# Patient Record
Sex: FEMALE | Race: WHITE | NOT HISPANIC OR LATINO | Employment: OTHER | URBAN - METROPOLITAN AREA
[De-identification: names, ages, dates, MRNs, and addresses within clinical notes are randomized per-mention and may not be internally consistent; named-entity substitution may affect disease eponyms.]

---

## 2017-01-29 DIAGNOSIS — E11.8 TYPE 2 DIABETES MELLITUS WITH COMPLICATION, WITHOUT LONG-TERM CURRENT USE OF INSULIN (H): Primary | ICD-10-CM

## 2017-01-30 RX ORDER — LINAGLIPTIN AND METFORMIN HYDROCHLORIDE 2.5; 1 MG/1; MG/1
TABLET, FILM COATED ORAL
Qty: 180 TABLET | OUTPATIENT
Start: 2017-01-30

## 2017-03-17 ENCOUNTER — OFFICE VISIT (OUTPATIENT)
Dept: FAMILY MEDICINE | Facility: CLINIC | Age: 62
End: 2017-03-17

## 2017-03-17 VITALS
BODY MASS INDEX: 28.53 KG/M2 | OXYGEN SATURATION: 97 % | TEMPERATURE: 98.1 F | RESPIRATION RATE: 16 BRPM | HEART RATE: 76 BPM | WEIGHT: 161 LBS | DIASTOLIC BLOOD PRESSURE: 80 MMHG | HEIGHT: 63 IN | SYSTOLIC BLOOD PRESSURE: 138 MMHG

## 2017-03-17 DIAGNOSIS — E11.8 TYPE 2 DIABETES MELLITUS WITH COMPLICATION, WITHOUT LONG-TERM CURRENT USE OF INSULIN (H): Primary | ICD-10-CM

## 2017-03-17 DIAGNOSIS — E66.3 OVERWEIGHT (BMI 25.0-29.9): ICD-10-CM

## 2017-03-17 DIAGNOSIS — Z11.59 NEED FOR HEPATITIS C SCREENING TEST: ICD-10-CM

## 2017-03-17 LAB — HBA1C MFR BLD: 6.9 % (ref 4–7)

## 2017-03-17 PROCEDURE — 83036 HEMOGLOBIN GLYCOSYLATED A1C: CPT | Performed by: FAMILY MEDICINE

## 2017-03-17 PROCEDURE — 99213 OFFICE O/P EST LOW 20 MIN: CPT | Performed by: FAMILY MEDICINE

## 2017-03-17 PROCEDURE — 36415 COLL VENOUS BLD VENIPUNCTURE: CPT | Performed by: FAMILY MEDICINE

## 2017-03-17 PROCEDURE — 86803 HEPATITIS C AB TEST: CPT | Mod: 90 | Performed by: FAMILY MEDICINE

## 2017-03-17 NOTE — LETTER
[facility]  [facility] [address]  [place]  [address]    March 27, 2017        Shayla Winter  [address]  [place] [address]              Dear Shayla Winter      LAB RESULTS:     The results of your recent hepatitis C screen were NORMAL.  If you have any further questions or problems, please contact our office at [phone].          Latisha Flanagan MD

## 2017-03-17 NOTE — MR AVS SNAPSHOT
After Visit Summary   3/17/2017    Shayla Winter    MRN: [id number]           Patient Information     Date Of Birth          1955        Visit Information        Provider Department      3/17/2017 11:45 AM Latisha Flanagan MD [facility]        Today's Diagnoses     Type 2 diabetes mellitus with complication, without long-term current use of insulin (H)    -  1    Need for hepatitis C screening test        Overweight (BMI 25.0-29.9)          Care Instructions    10 pound weight loss    Recheck 3 months        Follow-ups after your visit        Follow-up notes from your care team     Return in about 3 months (around 6/17/2017).      Your next 10 appointments already scheduled     May 31, 2017  8:00 AM CDT   Physical-Complete with Latisha Flanagan MD   [facility] ([facility])    [address]  [address]  [facility] [address] [phone]           Instruct patient that they should have nothing(except water) after midnight the evening prior to the appointment.              Who to contact     If you have questions or need follow up information about today's clinic visit or your schedule please contact [facility] directly at [phone].  Normal or non-critical lab and imaging results will be communicated to you by OpenSesamehart, letter or phone within 4 business days after the clinic has received the results. If you do not hear from us within 7 days, please contact the clinic through OpenSesamehart or phone. If you have a critical or abnormal lab result, we will notify you by phone as soon as possible.  Submit refill requests through [employer] or call your pharmacy and they will forward the refill request to us. Please allow 3 business days for your refill to be completed.          Additional Information About Your Visit        OpenSesameharSpotOnWay Information     [employer] lets you send messages to your doctor, view your test  "results, renew your prescriptions, schedule appointments and more. To sign up, go to [email or web address] . Click on \"Log in\" on the left side of the screen, which will take you to the Welcome page. Then click on \"Sign up Now\" on the right side of the page.     You will be asked to enter the access code listed below, as well as some personal information. Please follow the directions to create your username and password.     Your access code is: [email or web address]  Expires: 6/15/2017 12:39 PM     Your access code will  in 90 days. If you need help or a new code, please call your [place] clinic or [phone].        Care EveryWhere ID     This is your Care EveryWhere ID. This could be used by other organizations to access your [place] medical records  [id number]        Your Vitals Were     Pulse Temperature Respirations Height Last Period Pulse Oximetry    76 98.1  F (36.7  C) (Oral) 16 1.588 m (5' 2.5\") 2005 97%    BMI (Body Mass Index)                   28.98 kg/m2            Blood Pressure from Last 3 Encounters:   17 138/80   16 136/84   16 128/88    Weight from Last 3 Encounters:   17 73 kg (161 lb)   16 73.9 kg (163 lb)   16 74.4 kg (164 lb)              We Performed the Following     C FOOT EXAM  NO CHARGE     Hemoglobin A1c (BFP)     Hepatits C antibody (QUEST)     VENOUS COLLECTION          Where to get your medicines      These medications were sent to [facility] - [address], [facility] [address]     Phone:  [phone]     linagliptin-metFORMIN 2.5-1000 MG per tablet          Primary Care Provider Office Phone # Fax #    Latisha Flanagan [phone]       [facility] [address] [address]        Thank you!     Thank you for choosing [facility]  for your care. Our goal is always to provide you with excellent " care. Hearing back from our patients is one way we can continue to improve our services. Please take a few minutes to complete the written survey that you may receive in the mail after your visit with us. Thank you!             Your Updated Medication List - Protect others around you: Learn how to safely use, store and throw away your medicines at www.disposemymeds.org.          This list is accurate as of: 3/17/17 12:39 PM.  Always use your most recent med list.                   Brand Name Dispense Instructions for use    aspirin 81 MG tablet      Take 1 tablet (81 mg) by mouth daily       atorvastatin 20 MG tablet    LIPITOR    90 tablet    Take 1 tablet (20 mg) by mouth daily       ibuprofen 600 MG tablet    ADVIL/MOTRIN     Take 400 mg by mouth 3 times daily as needed       linagliptin-metFORMIN 2.5-1000 MG per tablet    JENTADUETO    180 tablet    Take 1 tablet by mouth two  times daily with meals       valsartan-hydrochlorothiazide 160-12.5 MG per tablet    DIOVAN HCT    90 tablet    Take 1 tablet by mouth daily

## 2017-03-17 NOTE — PROGRESS NOTES
"SUBJECTIVE:  Shayla Winter is an 61 year old female who presents for evaluation and treatment   of Type 2 diabetes mellitus. Age at diagnosis 52. Family history   positive for diabetes in the patient s mother.   Previous treatment modalities employed include diet, oral agents, exercise and ASA.   Current treatment includes diet, oral agents, exercise and ASA.     Current monitoring regimen: refuses  Home blood sugar records: NA  Last HgbA1c: 6.9  Diabetic complications: peripheral neuropathy  Cardiovascular risk factors: family history, lipids, diabetes mellitus, hypertension, obesity and sedentary life style    Current Outpatient Prescriptions   Medication     linagliptin-metFORMIN (JENTADUETO) 2.5-1000 MG per tablet     valsartan-hydrochlorothiazide (DIOVAN HCT) 160-12.5 MG per tablet     atorvastatin (LIPITOR) 20 MG tablet     aspirin 81 MG tablet     ibuprofen (ADVIL,MOTRIN) 600 MG tablet     [DISCONTINUED] linagliptin-metFORMIN (JENTADUETO) 2.5-1000 MG per tablet     No current facility-administered medications for this visit.      Allergies   Allergen Reactions     Niacin Hives     hives,swollen     Adhesive Tape Rash       Social History   Substance Use Topics     Smoking status: Never Smoker     Smokeless tobacco: Never Used     Alcohol use 0.5 oz/week     1 drink(s) per week      Comment: once a week       Review Of Systems  Skin: negative  Eyes: negative  Ears/Nose/Throat: negative  Respiratory: No shortness of breath, dyspnea on exertion, cough, or hemoptysis  Cardiovascular: negative  Gastrointestinal: negative  Genitourinary: negative  Musculoskeletal: negative  Neurologic: negative  Psychiatric: negative  Hematologic/Lymphatic/Immunologic: negative  Endocrine: diabetes    OBJECTIVE:  /80 (BP Location: Right arm, Patient Position: Chair, Cuff Size: Adult Large)  Pulse 76  Temp 98.1  F (36.7  C) (Oral)  Resp 16  Ht 1.588 m (5' 2.5\")  Wt 73 kg (161 lb)  LMP 09/14/2005  SpO2 97%  BMI 28.98 " kg/m2  General appearance: healthy, alert, no distress, cooperative, smiling and over weight  Skin: Skin color, texture, turgor normal. No rashes or lesions.  Eyes: conjunctivae/corneas clear. PERRL, EOM's intact. Fundi benign  Ears: negative  Oropharynx: Lips, mucosa, and tongue normal. Teeth and gums normal.  Neck: Neck supple. No adenopathy. Thyroid symmetric, normal size,, Carotids without bruits.  Lungs: negative, Percussion normal. Good diaphragmatic excursion. Lungs clear  Heart: negative, PMI normal. No lifts, heaves, or thrills. RRR. No murmurs, clicks gallops or rub  Abdomen: Abdomen soft, non-tender. BS normal. No masses, organomegaly  Extremities: Extremities normal. No deformities, edema, or skin discoloration.  Peripheral pulses: radial=4/4, femoral=4/4, popliteal=4/4, dorsalis pedis=4/4,  Neuro: Gait normal. Reflexes normal and symmetric. Sensation grossly WNL. Monofilament WNL  BMI : Body mass index is 28.98 kg/(m^2).    ASSESSMENT:  (E11.8) Type 2 diabetes mellitus with complication, without long-term current use of insulin (H)  (primary encounter diagnosis)  Plan: Hemoglobin A1c (BFP), VENOUS COLLECTION,         linagliptin-metFORMIN (JENTADUETO) 2.5-1000 MG         per tablet, C FOOT EXAM  NO CHARGE        A low fat diet, regular aerobic exercise like walking 30 minutes daily and weight control is the treatment recommendations at this time  Recheck 3 months fasting. Reviewed concepts of diabetes self-management stressing the primary   role of the patient in monitoring and maintaining control of   diabetes.    (Z11.59) Need for hepatitis C screening test  Plan: VENOUS COLLECTION, Hepatits C antibody (QUEST)            (E66.3) Overweight (BMI 25.0-29.9)  Plan: as above.

## 2017-03-17 NOTE — NURSING NOTE
Patient is here for a recheck of their medication.  Pre-Visit Screening :  Immunizations : up to date    Colonoscopy : is up to date  Mammogram : is up to date  Asthma Action Test/Plan : CARMEN  PHQ9/GAD7 :  NA  Pulse - regular    Medication Reconciliation: complete      CLASSIFICATION OF OVERWEIGHT AND OBESITY BY BMI                         Obesity Class           BMI(kg/m2)  Underweight                                    < 18.5  Normal                                         18.5-24.9  Overweight                                     25.0-29.9  OBESITY                     I                  30.0-34.9                              II                 35.0-39.9  EXTREME OBESITY             III                >40                             Patient's  BMI Body mass index is 28.98 kg/(m^2).  http://hin.nhlbi.nih.gov/menuplanner/menu.cgi  Questioned patient about current smoking habits.  Pt. has never smoked.

## 2017-03-18 LAB
HCV AB - QUEST: NORMAL
SIGNAL TO CUT OFF - QUEST: 0.02

## 2017-03-30 DIAGNOSIS — I10 ESSENTIAL HYPERTENSION, BENIGN: ICD-10-CM

## 2017-03-30 DIAGNOSIS — E11.8 TYPE 2 DIABETES MELLITUS WITH COMPLICATION, WITHOUT LONG-TERM CURRENT USE OF INSULIN (H): ICD-10-CM

## 2017-03-30 DIAGNOSIS — E78.2 MIXED HYPERLIPIDEMIA: ICD-10-CM

## 2017-03-30 RX ORDER — ATORVASTATIN CALCIUM 20 MG/1
TABLET, FILM COATED ORAL
Qty: 90 TABLET | OUTPATIENT
Start: 2017-03-30

## 2017-03-30 RX ORDER — VALSARTAN AND HYDROCHLOROTHIAZIDE 160; 12.5 MG/1; MG/1
TABLET, FILM COATED ORAL
Qty: 90 TABLET | OUTPATIENT
Start: 2017-03-30

## 2017-03-30 NOTE — TELEPHONE ENCOUNTER
Refused Prescriptions:                       Disp   Refills    valsartan-hydrochlorothiazide (DIOVAN-HCT)*90 tab*         Sig: Take 1 tablet by mouth  daily  Refused By: MIRIAM MANUEL  Reason for Refusal: Appt required, please call patient    atorvastatin (LIPITOR) 20 MG tablet [Pharm*90 tab*         Sig: Take 1 tablet by mouth  daily  Refused By: MIRIAM MANUEL  Reason for Refusal: Appt required, please call patient    Pt should have refills up to May, due for fasting ov then.  [place]  [phone] (home)

## 2017-05-10 DIAGNOSIS — E11.8 TYPE 2 DIABETES MELLITUS WITH COMPLICATION, WITHOUT LONG-TERM CURRENT USE OF INSULIN (H): ICD-10-CM

## 2017-05-10 RX ORDER — LINAGLIPTIN AND METFORMIN HYDROCHLORIDE 2.5; 1 MG/1; MG/1
TABLET, FILM COATED ORAL
Qty: 180 TABLET | OUTPATIENT
Start: 2017-05-10

## 2017-05-31 ENCOUNTER — OFFICE VISIT (OUTPATIENT)
Dept: FAMILY MEDICINE | Facility: CLINIC | Age: 62
End: 2017-05-31

## 2017-05-31 VITALS
HEART RATE: 76 BPM | DIASTOLIC BLOOD PRESSURE: 82 MMHG | TEMPERATURE: 98.2 F | BODY MASS INDEX: 28.88 KG/M2 | OXYGEN SATURATION: 98 % | WEIGHT: 163 LBS | SYSTOLIC BLOOD PRESSURE: 132 MMHG | RESPIRATION RATE: 16 BRPM | HEIGHT: 63 IN

## 2017-05-31 DIAGNOSIS — Z12.11 SPECIAL SCREENING FOR MALIGNANT NEOPLASMS, COLON: ICD-10-CM

## 2017-05-31 DIAGNOSIS — Z01.411 ENCOUNTER FOR GYNECOLOGICAL EXAMINATION WITH ABNORMAL FINDING: Primary | ICD-10-CM

## 2017-05-31 DIAGNOSIS — I10 ESSENTIAL HYPERTENSION, BENIGN: ICD-10-CM

## 2017-05-31 DIAGNOSIS — Z76.0 ENCOUNTER FOR MEDICATION REFILL: ICD-10-CM

## 2017-05-31 DIAGNOSIS — E78.2 MIXED HYPERLIPIDEMIA: ICD-10-CM

## 2017-05-31 DIAGNOSIS — Z12.39 BREAST SCREENING: ICD-10-CM

## 2017-05-31 DIAGNOSIS — E11.8 TYPE 2 DIABETES MELLITUS WITH COMPLICATION, WITHOUT LONG-TERM CURRENT USE OF INSULIN (H): ICD-10-CM

## 2017-05-31 LAB
% GRANULOCYTES: 56 %
ALBUMIN URINE MG/G CR: <30 MG/G CREATININE
ALBUMIN URINE MG/SPEC: 10
CREATININE URINE: 10
HBA1C MFR BLD: 7.2 % (ref 4–7)
HCT VFR BLD AUTO: 43.6 % (ref 35–47)
HEMOGLOBIN: 14.1 G/DL (ref 11.7–15.7)
LYMPHOCYTES NFR BLD AUTO: 35.1 %
MCH RBC QN AUTO: 30.7 PG (ref 26–33)
MCHC RBC AUTO-ENTMCNC: 32.3 G/DL (ref 31–36)
MCV RBC AUTO: 94.7 FL (ref 78–100)
MONOCYTES NFR BLD AUTO: 8.9 %
PLATELET COUNT - QUEST: 303 10^9/L (ref 150–375)
RBC # BLD AUTO: 4.6 10*12/L (ref 3.8–5.2)
WBC # BLD AUTO: 6.1 10*9/L (ref 4–11)

## 2017-05-31 PROCEDURE — 80061 LIPID PANEL: CPT | Mod: 90 | Performed by: FAMILY MEDICINE

## 2017-05-31 PROCEDURE — 36415 COLL VENOUS BLD VENIPUNCTURE: CPT | Performed by: FAMILY MEDICINE

## 2017-05-31 PROCEDURE — 80050 GENERAL HEALTH PANEL: CPT | Mod: 90 | Performed by: FAMILY MEDICINE

## 2017-05-31 PROCEDURE — 83036 HEMOGLOBIN GLYCOSYLATED A1C: CPT | Performed by: FAMILY MEDICINE

## 2017-05-31 PROCEDURE — 82043 UR ALBUMIN QUANTITATIVE: CPT | Performed by: FAMILY MEDICINE

## 2017-05-31 PROCEDURE — 99396 PREV VISIT EST AGE 40-64: CPT | Performed by: FAMILY MEDICINE

## 2017-05-31 PROCEDURE — 99213 OFFICE O/P EST LOW 20 MIN: CPT | Mod: 25 | Performed by: FAMILY MEDICINE

## 2017-05-31 RX ORDER — VALSARTAN AND HYDROCHLOROTHIAZIDE 160; 12.5 MG/1; MG/1
1 TABLET, FILM COATED ORAL DAILY
Qty: 90 TABLET | Refills: 1 | Status: SHIPPED | OUTPATIENT
Start: 2017-05-31 | End: 2017-11-22

## 2017-05-31 RX ORDER — ATORVASTATIN CALCIUM 20 MG/1
20 TABLET, FILM COATED ORAL DAILY
Qty: 90 TABLET | Refills: 1 | Status: SHIPPED | OUTPATIENT
Start: 2017-05-31 | End: 2017-11-22

## 2017-05-31 NOTE — NURSING NOTE
Patient is here for a full physical exam and pap.  Pre-Visit Screening :  Immunizations : up to date    Colonoscopy : is due and will be setting up at [facility]  Mammogram : is due and to be scheduled by patient for later completion  Asthma Action Plan/Test : na  PHQ9/GAD7 : na  Pulse - regular  Medication Reconciliation: complete    BP done on the left arm, with a lg sized cuff.  Pulse - regular  My Chart - declines    CLASSIFICATION OF OVERWEIGHT AND OBESITY BY BMI                         Obesity Class           BMI(kg/m2)  Underweight                                    < 18.5  Normal                                         18.5-24.9  Overweight                                     25.0-29.9  OBESITY                     I                  30.0-34.9                              II                 35.0-39.9  EXTREME OBESITY             III                >40                             Patient's  BMI Body mass index is 29.1 kg/(m^2).  http://hin.nhlbi.nih.gov/menuplanner/menu.cgi  Questioned patient about current smoking habits.  Pt. has never smoked.  ETOH screening:  Questions:  1-How often do you have a drink containing alcohol?                             1 times per month(s)  2-How many drinks containing alcohol do you have on a typical day when you are         Drinking?                              1   3- How often do you have 5 or more drinks on one occasion?                              never per never    Have you ever:  None of the patient's responses to the CAGE screening were positive / Negative CAGE score

## 2017-05-31 NOTE — MR AVS SNAPSHOT
After Visit Summary   5/31/2017    Shayla Winter    MRN: [id number]           Patient Information     Date Of Birth          1955        Visit Information        Provider Department      5/31/2017 8:00 AM Latisha Flanagan MD [facility]        Today's Diagnoses     Encounter for gynecological examination with abnormal finding    -  1    Type 2 diabetes mellitus with complication, without long-term current use of insulin (H)        Essential hypertension, benign        Mixed hyperlipidemia        Special screening for malignant neoplasms, colon        Breast screening        Encounter for medication refill          Care Instructions    Reviewed concepts of diabetes self-management stressing the primary   role of the patient in monitoring and maintaining control of   Diabetes.     Consult with specialists/ need help with her engagement of her diabetes. Increased does of linagliptin. Potential medication side effects were discussed with the patient; let me know if any occur.  Recheck 3 months. A low fat diet, regular aerobic exercise like walking 30 minutes daily and weight control is the treatment recommendations at this time.    1)  Medication: continue current medication regimen unchanged for BP and cholesterol  2)  Low fat, low cholesterol diet  3)  Regular aerobic exercise  4)  Recheck in 3 months, sooner should new symptoms or   problems arise.    Patient Education: Reviewed risks of elevated lipids and principles   of treatment.              Follow-ups after your visit        Additional Services     ENDOCRINOLOGY ADULT REFERRAL       Your provider has referred you to: [facility]: [facility] [phone]   [email or web address]      Please be aware that coverage of these services is subject to the terms and limitations of your health insurance plan.  Call member services at your health plan with any benefit or coverage questions.       Please bring the following to your appointment:    >>   Any x-rays, CTs or MRIs which have been performed.  Contact the facility where they were done to arrange for  prior to your scheduled appointment.    >>   List of current medications   >>   This referral request   >>   Any documents/labs given to you for this referral            GASTROENTEROLOGY ADULT REF PROCEDURE ONLY           RADIOLOGY REFERRAL       Your provider has referred you to: [facility]: [facility] [place] [phone]   [email or web address]    Please be aware that coverage of these services is subject to the terms and limitations of your health insurance plan.  Call member services at your health plan with any benefit or coverage questions.      Please bring the following to your appointment:    >>   Any x-rays, CTs or MRIs which have been performed.  Contact the facility where they were done to arrange for  prior to your scheduled appointment.    >>   List of current medications   >>   This referral request   >>   Any documents/labs given to you for this referral    Prior authorization is required for MRI/MRA, CT, Dexa Scans and Worker's Compensation cases.                  Future tests that were ordered for you today     Open Future Orders        Priority Expected Expires Ordered    Mammo Screening digital (bilat) Routine  5/31/2018 5/31/2017            Who to contact     If you have questions or need follow up information about today's clinic visit or your schedule please contact [facility] directly at [phone].  Normal or non-critical lab and imaging results will be communicated to you by MyChart, letter or phone within 4 business days after the clinic has received the results. If you do not hear from us within 7 days, please contact the clinic through MyChart or phone. If you have a critical or abnormal lab result, we will notify you by phone as soon as possible.  Submit refill requests  "through Chartboost or call your pharmacy and they will forward the refill request to us. Please allow 3 business days for your refill to be completed.          Additional Information About Your Visit        Swoon Editionshart Information     Chartboost lets you send messages to your doctor, view your test results, renew your prescriptions, schedule appointments and more. To sign up, go to [email or web address] . Click on \"Log in\" on the left side of the screen, which will take you to the Welcome page. Then click on \"Sign up Now\" on the right side of the page.     You will be asked to enter the access code listed below, as well as some personal information. Please follow the directions to create your username and password.     Your access code is: [email or web address]  Expires: 6/15/2017 12:39 PM     Your access code will  in 90 days. If you need help or a new code, please call your [place] clinic or [phone].        Care EveryWhere ID     This is your Care EveryWhere ID. This could be used by other organizations to access your [place] medical records  [id number]        Your Vitals Were     Pulse Temperature Respirations Height Last Period Pulse Oximetry    76 98.2  F (36.8  C) (Oral) 16 1.594 m (5' 2.75\") 2005 98%    BMI (Body Mass Index)                   29.1 kg/m2            Blood Pressure from Last 3 Encounters:   17 132/82   17 138/80   16 136/84    Weight from Last 3 Encounters:   17 73.9 kg (163 lb)   17 73 kg (161 lb)   16 73.9 kg (163 lb)              We Performed the Following     Albumin Random Urine Quantitative     AUTO HEMOGRAM/PLATE/DIFF [47738.000]     COMPREHENSIVE METABOLIC PANEL     ENDOCRINOLOGY ADULT REFERRAL     GASTROENTEROLOGY ADULT REF PROCEDURE ONLY     HEMOGLOBIN A1C     LIPID PANEL     RADIOLOGY REFERRAL     TSH     VENIPUNC FNGR,HEEL,EAR [[id number]]          Today's Medication Changes          These changes are accurate as of: 17  9:58 AM.  If you have " any questions, ask your nurse or doctor.               Start taking these medicines.        Dose/Directions    linagliptin-metFORMIN ER 5-1000 MG per tablet   Commonly known as:  JENTADUETO XR   Used for:  Type 2 diabetes mellitus with complication, without long-term current use of insulin (H)   Replaces:  linagliptin-metFORMIN 2.5-1000 MG per tablet   Started by:  Latisha Flanagan MD        Dose:  1 tablet   Take 1 tablet by mouth daily with food   Quantity:  90 tablet   Refills:  0         Stop taking these medicines if you haven't already. Please contact your care team if you have questions.     linagliptin-metFORMIN 2.5-1000 MG per tablet   Commonly known as:  JENTADUETO   Replaced by:  linagliptin-metFORMIN ER 5-1000 MG per tablet   Stopped by:  Latisha Flanagan MD                Where to get your medicines      These medications were sent to [facility] - [address], [facility] [address]     Phone:  [phone]     atorvastatin 20 MG tablet    linagliptin-metFORMIN ER 5-1000 MG per tablet    valsartan-hydrochlorothiazide 160-12.5 MG per tablet                Primary Care Provider Office Phone # Fax #    Latisha Flanagan [phone]       [facility] [address] [place] [address]        Thank you!     Thank you for choosing [facility]  for your care. Our goal is always to provide you with excellent care. Hearing back from our patients is one way we can continue to improve our services. Please take a few minutes to complete the written survey that you may receive in the mail after your visit with us. Thank you!             Your Updated Medication List - Protect others around you: Learn how to safely use, store and throw away your medicines at www.disposemymeds.org.          This list is accurate as of: 5/31/17  9:58 AM.  Always use your most recent med list.                    Brand Name Dispense Instructions for use    aspirin 81 MG tablet      Take 1 tablet (81 mg) by mouth daily       atorvastatin 20 MG tablet    LIPITOR    90 tablet    Take 1 tablet (20 mg) by mouth daily       ibuprofen 600 MG tablet    ADVIL/MOTRIN     Take 400 mg by mouth 3 times daily as needed       linagliptin-metFORMIN ER 5-1000 MG per tablet    JENTADUETO XR    90 tablet    Take 1 tablet by mouth daily with food       valsartan-hydrochlorothiazide 160-12.5 MG per tablet    DIOVAN HCT    90 tablet    Take 1 tablet by mouth daily

## 2017-05-31 NOTE — LETTER
[facility]  [facility] [address]  [place]  [address]    June 2, 2017        Shayla Winter  [address]  [place] [address]              Dear Shayla Winter      LAB RESULTS:     The results of your recent Comprehensive profile, Lipid profile and Thyroid Function were NORMAL with the exception of fasting glucose and triglyceride levels reflecting your type 2 diabetes not well controlled. See next page. The change in creatinine being low is not significant ( elevated creatinine levels are a concern).      If you have any further questions or problems, please contact our office at [phone].          Latisha Flanagan MD

## 2017-05-31 NOTE — PROGRESS NOTES
SUBJECTIVE:  Shayla Winter is an 61 year old  postmenopausal woman   who presents for annual gyn exam, diabetes, cholesterol and hypertension. Menopause at age 50. No   bleeding, spotting, or discharge noted.     Estrogen replacement therapy: never  ENZO exposure: no  History of abnormal Pap smear: No  Family history of uterine or ovarian cancer: No  Regular self breast exam: Yes  History of abnormal mammogram: No  Family history of breast cancer: No  History of abnormal lipids: Yes:  And hypertension/diabetes    Past Medical History:  No date: Arthritis  No date: Diabetes mellitus (H)  : Other and unspecified hyperlipidemia     Comment: Hyperlipidemia  : Unspecified essential hypertension     Comment: Hypertension, Essential      Family History    Hypertension Mother     Lipids Mother     DIABETES Mother     Comment: borderline    C.A.D. Mother     Hypertension Father     DIABETES Other     Comment: sister's daughter    Thyroid Disease No family hx of        Past Surgical History:  2013: ARTHROPLASTY HIP ANTERIOR     Comment: Procedure: ARTHROPLASTY HIP ANTERIOR;  RIGHT               DIRECT ANTERIOR TOTAL HIP ARTHROPLASTY (DEPUY)^              (HANA TABLE, C-ARM);  Surgeon: Cricket Castellano MD;  Location:  OR  : C C-SEC ONLY,PREV C-SEC  : C TREAT ECTOPIC PREG,RMV TUBE/OVARY    Current Outpatient Prescriptions:  linagliptin-metFORMIN (JENTADUETO) 2.5-1000 MG per tablet   valsartan-hydrochlorothiazide (DIOVAN HCT) 160-12.5 MG per tablet   atorvastatin (LIPITOR) 20 MG tablet   aspirin 81 MG tablet   ibuprofen (ADVIL,MOTRIN) 600 MG tablet   No current facility-administered medications for this visit.    -- Niacin -- Hives   --  hives,swollen  -- Adhesive Tape -- Rash       Smoking status: Never Smoker    Smokeless tobacco: Never Used    Alcohol use Yes  0.5 oz/week    1 drink(s) per week         Comment: once a week       Review Of Systems  Ears/Nose/Throat: hearing loss  "noted  Respiratory: No shortness of breath, dyspnea on exertion, cough, or hemoptysis  Cardiovascular: negative  Gastrointestinal: needs colonoscopy  Genitourinary: negative    OBJECTIVE:  /82 (BP Location: Left arm, Cuff Size: Adult Large)  Pulse 76  Temp 98.2  F (36.8  C) (Oral)  Resp 16  Ht 1.594 m (5' 2.75\")  Wt 73.9 kg (163 lb)  LMP 09/14/2005  SpO2 98%  BMI 29.1 kg/m2  General appearance: healthy, alert, no distress, cooperative, smiling and over weight  Skin: Skin color, texture, turgor normal. No rashes or lesions.  Ears: negative  Nose/Sinuses: Nares normal. Septum midline. Mucosa normal. No drainage or sinus tenderness.  Oropharynx: Lips, mucosa, and tongue normal. Teeth and gums normal.  Neck: Neck supple. No adenopathy. Thyroid symmetric, normal size,, Carotids without bruits.  Lungs: negative, Percussion normal. Good diaphragmatic excursion. Lungs clear  Heart: negative, PMI normal. No lifts, heaves, or thrills. RRR. No murmurs, clicks gallops or rub  Breasts: Inspection negative. No nipple discharge or bleeding. No masses.  Abdomen: Abdomen soft, non-tender. BS normal. No masses, organomegaly, positive findings: obese  Pelvic: External genitalia and vagina normal. Bimanual and rectovaginal normal., positive findings:  vaginal mucosa atrophy  BMI : Body mass index is 29.1 kg/(m^2).    ASSESSMENT:(Z01.411) Encounter for gynecological examination with abnormal finding  (primary encounter diagnosis)  Plan: VENIPUNC FNGR,HEEL,EAR [[id number]], AUTO         HEMOGRAM/PLATE/DIFF [44368.000]        Total calcium intake of 1500 mgm/day, vitamin D 400-800IU/day and a regular weight bearing exercise program for prevention of osteoporosis is recommended treatment at this time.      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 Increased does of linagliptin. Potential medication side effects were discussed with the patient; let me know if any occur.  Recheck 3 months. A low fat diet, regular aerobic exercise like walking 30 minutes daily and weight control is the treatment recommendations at this time      (I10) Essential hypertension, benign  Plan: VENIPUNC FNGR,HEEL,EAR [[id number]], COMPREHENSIVE         METABOLIC PANEL, valsartan-hydrochlorothiazide         (DIOVAN HCT) 160-12.5 MG per tablet        1)  Medication: continue current medication regimen unchanged  2)  Dietary sodium restriction  3)  Regular aerobic exercise  4)  Recheck in 3 months, sooner should new symptoms or   problems arise.    Patient Education: Reviewed risks of hypertension and principles of   treatment.        (E78.2) Mixed hyperlipidemia  Plan: VENIPUNC FNGR,HEEL,EAR [[id number]], LIPID PANEL,         atorvastatin (LIPITOR) 20 MG tablet        1)  Medication: continue current medication regimen unchanged  2)  Low fat, low cholesterol diet  3)  Regular aerobic exercise  4)  Recheck in 3 months, sooner should new symptoms or   problems arise.    Patient Education: Reviewed risks of elevated lipids and principles   of treatment.        (Z12.11) Special screening for malignant neoplasms, colon  Plan: GASTROENTEROLOGY ADULT REF PROCEDURE ONLY        encouraged    (Z12.39) Breast screening  Plan: Mammo Screening digital (bilat), RADIOLOGY         REFERRAL        encouraged    (Z76.0) Encounter for medication refill  Plan: VENIPUNC FNGR,HEEL,EAR [[id number]], HEMOGLOBIN A1C,        AUTO HEMOGRAM/PLATE/DIFF  [29382.000], Albumin         Random Urine Quantitative, LIPID PANEL,         COMPREHENSIVE METABOLIC PANEL, TSH                Dx:  1)  Pap smear, mammogram  2)  Lipids at appropriate intervals    PE:  Reviewed health maintenance including diet, regular exercise,   estrogen replacement and periodic exams.    SUBJECTIVE:  Shayla Winter is an 61 year old female who presents for evaluation and treatment   of Type 2 diabetes mellitus. Age at diagnosis 52. Family history   positive for diabetes in the patient s mother.   Previous treatment modalities employed include diet, oral agents, exercise and ASA.   Current treatment includes diet, oral agents, exercise and ASA.     Current monitoring regimen: none/refuses  Home blood sugar records: refuses  Last HgbA1c: 7.2  Diabetic complications: peripheral neuropathy  Cardiovascular risk factors: family history, lipids, diabetes mellitus, hypertension, obesity and stress    Current Outpatient Prescriptions   Medication     linagliptin-metFORMIN (JENTADUETO) 2.5-1000 MG per tablet     valsartan-hydrochlorothiazide (DIOVAN HCT) 160-12.5 MG per tablet     atorvastatin (LIPITOR) 20 MG tablet     aspirin 81 MG tablet     ibuprofen (ADVIL,MOTRIN) 600 MG tablet     No current facility-administered medications for this visit.      Allergies   Allergen Reactions     Niacin Hives     hives,swollen     Adhesive Tape Rash       Social History   Substance Use Topics     Smoking status: Never Smoker     Smokeless tobacco: Never Used     Alcohol use 0.5 oz/week     1 drink(s) per week      Comment: once a week       Review Of Systems  Skin: negative  Eyes: negative  Ears/Nose/Throat: negative  Respiratory: No shortness of breath, dyspnea on exertion, cough, or hemoptysis  Cardiovascular: BP and cholesterol well controlled and negative  Gastrointestinal: needs colonoscopy  Genitourinary: negative  Musculoskeletal: negative  Neurologic: negative  Psychiatric: excessive stress-single and eats  "poorly  Hematologic/Lymphatic/Immunologic: negative  Endocrine: diabetes    OBJECTIVE:  /82 (BP Location: Left arm, Cuff Size: Adult Large)  Pulse 76  Temp 98.2  F (36.8  C) (Oral)  Resp 16  Ht 1.594 m (5' 2.75\")  Wt 73.9 kg (163 lb)  LMP 09/14/2005  SpO2 98%  BMI 29.1 kg/m2  General appearance: healthy, alert, no distress, cooperative, smiling and over weight  Skin: Skin color, texture, turgor normal. No rashes or lesions.  Eyes: conjunctivae/corneas clear. PERRL, EOM's intact. Fundi benign  Ears: negative  Oropharynx: Lips, mucosa, and tongue normal. Teeth and gums normal.  Neck: Neck supple. No adenopathy. Thyroid symmetric, normal size,, Carotids without bruits.  Lungs: negative, Percussion normal. Good diaphragmatic excursion. Lungs clear  Heart: negative, PMI normal. No lifts, heaves, or thrills. RRR. No murmurs, clicks gallops or rub  Abdomen: Abdomen soft, non-tender. BS normal. No masses, organomegaly, positive findings: obese  Extremities: Extremities normal. No deformities, edema, or skin discoloration.  Peripheral pulses: radial=4/4, femoral=4/4, popliteal=4/4, dorsalis pedis=4/4,  Neuro: Gait normal. Reflexes normal and symmetric. Sensation grossly WNL.  "

## 2017-05-31 NOTE — PATIENT INSTRUCTIONS
Reviewed concepts of diabetes self-management stressing the primary   role of the patient in monitoring and maintaining control of   Diabetes.     Consult with specialists/ need help with her engagement of her diabetes. Increased does of linagliptin. Potential medication side effects were discussed with the patient; let me know if any occur.  Recheck 3 months. A low fat diet, regular aerobic exercise like walking 30 minutes daily and weight control is the treatment recommendations at this time.    1)  Medication: continue current medication regimen unchanged for BP and cholesterol  2)  Low fat, low cholesterol diet  3)  Regular aerobic exercise  4)  Recheck in 3 months, sooner should new symptoms or   problems arise.    Patient Education: Reviewed risks of elevated lipids and principles   of treatment.

## 2017-06-01 LAB
ALBUMIN SERPL-MCNC: 4.5 G/DL (ref 3.6–5.1)
ALBUMIN/GLOB SERPL: 1.7 (CALC) (ref 1–2.5)
ALP SERPL-CCNC: 64 U/L (ref 33–130)
ALT SERPL-CCNC: 20 U/L (ref 6–29)
AST SERPL-CCNC: 15 U/L (ref 10–35)
BILIRUB SERPL-MCNC: 0.4 MG/DL (ref 0.2–1.2)
BUN SERPL-MCNC: 12 MG/DL (ref 7–25)
BUN/CREATININE RATIO: 24 (CALC) (ref 6–22)
CALCIUM SERPL-MCNC: 9.8 MG/DL (ref 8.6–10.4)
CHLORIDE SERPLBLD-SCNC: 100 MMOL/L (ref 98–110)
CHOLEST SERPL-MCNC: 171 MG/DL (ref 125–200)
CHOLEST/HDLC SERPL: 2.9 (CALC)
CO2 SERPL-SCNC: 25 MMOL/L (ref 20–31)
CREAT SERPL-MCNC: 0.49 MG/DL (ref 0.5–0.99)
EGFR AFRICAN AMERICAN - QUEST: 123 ML/MIN/1.73M2
GFR SERPL CREATININE-BSD FRML MDRD: 106 ML/MIN/1.73M2
GLOBULIN, CALCULATED - QUEST: 2.7 G/DL (CALC) (ref 1.9–3.7)
GLUCOSE - QUEST: 143 MG/DL (ref 65–99)
HDLC SERPL-MCNC: 59 MG/DL
LDLC SERPL CALC-MCNC: 80 MG/DL (CALC)
NONHDLC SERPL-MCNC: 112 MG/DL (CALC)
POTASSIUM SERPL-SCNC: 4.2 MMOL/L (ref 3.5–5.3)
PROT SERPL-MCNC: 7.2 G/DL (ref 6.1–8.1)
SODIUM SERPL-SCNC: 138 MMOL/L (ref 135–146)
TRIGL SERPL-MCNC: 159 MG/DL
TSH SERPL-ACNC: 0.85 MIU/L (ref 0.4–4.5)

## 2017-07-21 ENCOUNTER — TRANSFERRED RECORDS (OUTPATIENT)
Dept: FAMILY MEDICINE | Facility: CLINIC | Age: 62
End: 2017-07-21

## 2017-07-21 LAB — MAMMOGRAM: NORMAL

## 2017-07-24 DIAGNOSIS — E11.8 TYPE 2 DIABETES MELLITUS WITH COMPLICATION, WITHOUT LONG-TERM CURRENT USE OF INSULIN (H): ICD-10-CM

## 2017-07-24 RX ORDER — LINAGLIPTIN AND METFORMIN HYDROCHLORIDE 5; 1000 MG/1; MG/1
TABLET, FILM COATED, EXTENDED RELEASE ORAL
Qty: 90 TABLET | OUTPATIENT
Start: 2017-07-24

## 2017-07-31 ENCOUNTER — HOSPITAL ENCOUNTER (OUTPATIENT)
Facility: CLINIC | Age: 62
Discharge: HOME | End: 2017-07-31
Attending: COLON & RECTAL SURGERY | Admitting: COLON & RECTAL SURGERY
Payer: COMMERCIAL

## 2017-07-31 VITALS
RESPIRATION RATE: 16 BRPM | DIASTOLIC BLOOD PRESSURE: 79 MMHG | SYSTOLIC BLOOD PRESSURE: 127 MMHG | OXYGEN SATURATION: 98 %

## 2017-07-31 LAB
COLONOSCOPY: NORMAL
GLUCOSE BLDC GLUCOMTR-MCNC: 183 MG/DL (ref 70–99)

## 2017-07-31 PROCEDURE — G0500 MOD SEDAT ENDO SERVICE >5YRS: HCPCS | Performed by: COLON & RECTAL SURGERY

## 2017-07-31 PROCEDURE — G0121 COLON CA SCRN NOT HI RSK IND: HCPCS | Performed by: COLON & RECTAL SURGERY

## 2017-07-31 PROCEDURE — 25000128 H RX IP 250 OP 636: Performed by: COLON & RECTAL SURGERY

## 2017-07-31 PROCEDURE — 45378 DIAGNOSTIC COLONOSCOPY: CPT | Performed by: COLON & RECTAL SURGERY

## 2017-07-31 PROCEDURE — 82962 GLUCOSE BLOOD TEST: CPT

## 2017-07-31 PROCEDURE — 99153 MOD SED SAME PHYS/QHP EA: CPT | Performed by: COLON & RECTAL SURGERY

## 2017-07-31 RX ORDER — ONDANSETRON 4 MG/1
4 TABLET, ORALLY DISINTEGRATING ORAL EVERY 6 HOURS PRN
Status: DISCONTINUED | OUTPATIENT
Start: 2017-07-31 | End: 2017-07-31 | Stop reason: HOSPADM

## 2017-07-31 RX ORDER — FLUMAZENIL 0.1 MG/ML
0.2 INJECTION, SOLUTION INTRAVENOUS
Status: DISCONTINUED | OUTPATIENT
Start: 2017-07-31 | End: 2017-07-31 | Stop reason: HOSPADM

## 2017-07-31 RX ORDER — ONDANSETRON 2 MG/ML
4 INJECTION INTRAMUSCULAR; INTRAVENOUS EVERY 6 HOURS PRN
Status: DISCONTINUED | OUTPATIENT
Start: 2017-07-31 | End: 2017-07-31 | Stop reason: HOSPADM

## 2017-07-31 RX ORDER — ONDANSETRON 2 MG/ML
4 INJECTION INTRAMUSCULAR; INTRAVENOUS
Status: DISCONTINUED | OUTPATIENT
Start: 2017-07-31 | End: 2017-07-31 | Stop reason: HOSPADM

## 2017-07-31 RX ORDER — LIDOCAINE 40 MG/G
CREAM TOPICAL
Status: DISCONTINUED | OUTPATIENT
Start: 2017-07-31 | End: 2017-07-31 | Stop reason: HOSPADM

## 2017-07-31 RX ORDER — NALOXONE HYDROCHLORIDE 0.4 MG/ML
.1-.4 INJECTION, SOLUTION INTRAMUSCULAR; INTRAVENOUS; SUBCUTANEOUS
Status: DISCONTINUED | OUTPATIENT
Start: 2017-07-31 | End: 2017-07-31 | Stop reason: HOSPADM

## 2017-07-31 RX ORDER — FENTANYL CITRATE 50 UG/ML
INJECTION, SOLUTION INTRAMUSCULAR; INTRAVENOUS PRN
Status: DISCONTINUED | OUTPATIENT
Start: 2017-07-31 | End: 2017-07-31 | Stop reason: HOSPADM

## 2017-07-31 NOTE — OP NOTE
See Provation Note In Chart    Rachelle Vela MD  [facility]  Office Phone # [phone]

## 2017-07-31 NOTE — H&P
Pre-Endoscopy History and Physical     Shayla Winter MRN# [id number]   YOB: 1955 Age: 62 year old     Date of Procedure: 7/31/2017  Primary care provider: Latisha Flanagan  Type of Endoscopy: colonoscopy  Reason for Procedure: screening  Type of Anesthesia Anticipated: Moderate Sedation    HPI:    Shayla is a 62 year old female who will be undergoing the above procedure.      A history and physical has been performed. The patient's medications and allergies have been reviewed. The risks and benefits of the procedure and the sedation options and risks were discussed with the patient.  All questions were answered and informed consent was obtained.      She denies a personal or family history of anesthesia complications or bleeding disorders.     Allergies   Allergen Reactions     Niacin Hives     hives,swollen     Adhesive Tape Rash        Prior to Admission Medications   Prescriptions Last Dose Informant Patient Reported? Taking?   aspirin 81 MG tablet 7/30/2017 at Unknown time  No Yes   Sig: Take 1 tablet (81 mg) by mouth daily   atorvastatin (LIPITOR) 20 MG tablet 7/30/2017 at Unknown time  No Yes   Sig: Take 1 tablet (20 mg) by mouth daily   ibuprofen (ADVIL,MOTRIN) 600 MG tablet Unknown at Unknown time  Yes No   Sig: Take 400 mg by mouth 3 times daily as needed   linagliptin-metFORMIN ER (JENTADUETO XR) 5-1000 MG per tablet Past Week at Unknown time  No Yes   Sig: Take 1 tablet by mouth daily with food   valsartan-hydrochlorothiazide (DIOVAN HCT) 160-12.5 MG per tablet 7/30/2017 at Unknown time  No Yes   Sig: Take 1 tablet by mouth daily      Facility-Administered Medications: None       Patient Active Problem List   Diagnosis     Essential hypertension, benign     Hyperlipidemia     Symptomatic menopausal or female climacteric states     ACP (advance care planning)     Health Care Home     Overweight (BMI 25.0-29.9)     S/P hip replacement     Type 2 diabetes mellitus with complication, without  "long-term current use of insulin (H)        Past Medical History:   Diagnosis Date     Arthritis      Diabetes mellitus (H)      Other and unspecified hyperlipidemia 1998    Hyperlipidemia     Unspecified essential hypertension 1988    Hypertension, Essential        Past Surgical History:   Procedure Laterality Date     ARTHROPLASTY HIP ANTERIOR  8/28/2013    Procedure: ARTHROPLASTY HIP ANTERIOR;  RIGHT DIRECT ANTERIOR TOTAL HIP ARTHROPLASTY (DEPUY)^ (HANA TABLE, C-ARM);  Surgeon: Cricket Castellano MD;  Location: [facility] OR     C C-SEC ONLY,PREV C-SEC  1980/1983     C TREAT ECTOPIC PREG,RMV TUBE/OVARY  1982       Social History   Substance Use Topics     Smoking status: Never Smoker     Smokeless tobacco: Never Used     Alcohol use 0.5 oz/week     1 Standard drinks or equivalent per week      Comment: once a week       Family History   Problem Relation Age of Onset     Hypertension Mother      Lipids Mother      DIABETES Mother      borderline     C.A.D. Mother      Hypertension Father      DIABETES Other      sister's daughter     Thyroid Disease No family hx of      Colon Cancer No family hx of        REVIEW OF SYSTEMS:     5 point ROS negative except as noted above in HPI, including Gen., Resp., CV, GI &  system review.      PHYSICAL EXAM:   LMP 09/14/2005 Estimated body mass index is 29.1 kg/(m^2) as calculated from the following:    Height as of 5/31/17: 1.594 m (5' 2.75\").    Weight as of 5/31/17: 73.9 kg (163 lb).   GENERAL APPEARANCE: healthy and alert  MENTAL STATUS: alert  AIRWAY EXAM: Mallampatti Class II (visualization of the soft palate, fauces, and uvula)  RESP: lungs clear to auscultation - no rales, rhonchi or wheezes  CV: regular rates and rhythm      DIAGNOSTICS:    Not indicated      IMPRESSION   ASA Class 2 - Mild systemic disease        PLAN:       Plan for colonoscopy. We discussed the risks, benefits and alternatives and the patient wished to proceed.    The above has been forwarded to the " consulting provider.      Signed Electronically by: Rachelle Vela MD  July 31, 2017

## 2017-07-31 NOTE — DISCHARGE INSTRUCTIONS
Understanding Diverticulosis and Diverticulitis     Pouches or diverticula usually occur in the lower part of the colon called the sigmoid.      Diverticulitis occurs when the pouches become inflamed.     The colon (large intestine) is the last part of the digestive tract. It absorbs water from stool and changes it from a liquid to a solid. In certain cases, small pouches called diverticula can form in the colon wall. This condition is called diverticulosis. The pouches can become infected. If this happens, it becomes a more serious problem called diverticulitis. These problems can be painful. But they can be managed.   Managing Your Condition  Diet changes or taking medications are often tried first. These may be enough to bring relief. If the case is bad, surgery may be done. You and your doctor can discuss the plan that is best for you.  If You Have Diverticulosis  Diet changes are often enough to control symptoms. The main changes are adding fiber (roughage) and drinking more water. Fiber absorbs water as it travels through your colon. This helps your stool stay soft and move smoothly. Water helps this process. If needed, you may be told to take over-the-counter stool softeners. To help relieve pain, antispasmodic medications may be prescribed.  If You Have Diverticulitis  Treatment depends on how bad your symptoms are.  For mild symptoms: You may be put on a liquid diet for a short time. You may also be prescribed antibiotics. If these two steps relieve your symptoms, you may then be prescribed a high-fiber diet. If you still have symptoms, your doctor will discuss further treatment options with you.  For severe symptoms: You may need to be admitted to the hospital. There, you can be given IV antibiotics and fluids. Once symptoms are under control, the above treatments may be tried. If these don t control your condition, your doctor may discuss the option of having surgery with you.  [place] [facility]  Help keep your colon healthy with a diet that includes plenty of high-fiber fruits, vegetables, and whole grains. Drink plenty of liquids like water and juice. Your doctor may also recommend avoiding seeds and nuts.          [id number] [employer], [address] [place] [address], [place] [address]. All rights reserved. This information is not intended as a substitute for professional medical care. Always follow your healthcare professional's instructions.

## 2017-07-31 NOTE — IP AVS SNAPSHOT
MRN:[id number]                      After Visit Summary   7/31/2017    Shayla Winter    MRN: [id number]           Thank you!     Thank you for choosing [facility] for your care. Our goal is always to provide you with excellent care. Hearing back from our patients is one way we can continue to improve our services. Please take a few minutes to complete the written survey that you may receive in the mail after you visit. If you would like to speak to someone directly about your visit please contact Patient Relations at [phone]. Thank you!          Patient Information     Date Of Birth          1955        About your hospital stay     You were admitted on:  July 31, 2017 You last received care in the:  [facility]    You were discharged on:  July 31, 2017       Who to Call     For medical emergencies, please call 911.  For non-urgent questions about your medical care, please call your primary care provider or clinic, [phone]  For questions related to your surgery, please call your surgery clinic        Attending Provider     Provider Specialty    Rachelle Vela MD Colon and Rectal Surgery       Primary Care Provider Office Phone # Fax #    Latisha Flanagan [phone]      Further instructions from your care team         Understanding Diverticulosis and Diverticulitis     Pouches or diverticula usually occur in the lower part of the colon called the sigmoid.      Diverticulitis occurs when the pouches become inflamed.     The colon (large intestine) is the last part of the digestive tract. It absorbs water from stool and changes it from a liquid to a solid. In certain cases, small pouches called diverticula can form in the colon wall. This condition is called diverticulosis. The pouches can become infected. If this happens, it becomes a more serious problem called diverticulitis. These problems can be painful. But they can be managed.    Managing Your Condition  Diet changes or taking medications are often tried first. These may be enough to bring relief. If the case is bad, surgery may be done. You and your doctor can discuss the plan that is best for you.  If You Have Diverticulosis  Diet changes are often enough to control symptoms. The main changes are adding fiber (roughage) and drinking more water. Fiber absorbs water as it travels through your colon. This helps your stool stay soft and move smoothly. Water helps this process. If needed, you may be told to take over-the-counter stool softeners. To help relieve pain, antispasmodic medications may be prescribed.  If You Have Diverticulitis  Treatment depends on how bad your symptoms are.  For mild symptoms: You may be put on a liquid diet for a short time. You may also be prescribed antibiotics. If these two steps relieve your symptoms, you may then be prescribed a high-fiber diet. If you still have symptoms, your doctor will discuss further treatment options with you.  For severe symptoms: You may need to be admitted to the hospital. There, you can be given IV antibiotics and fluids. Once symptoms are under control, the above treatments may be tried. If these don t control your condition, your doctor may discuss the option of having surgery with you.  [place] to Colon Health  Help keep your colon healthy with a diet that includes plenty of high-fiber fruits, vegetables, and whole grains. Drink plenty of liquids like water and juice. Your doctor may also recommend avoiding seeds and nuts.          2496-5366 [facility], [address] [place] [address]. All rights reserved. This information is not intended as a substitute for professional medical care. Always follow your healthcare professional's instructions.    Pending Results     No orders found from 7/29/2017 to 8/1/2017.            Admission Information     Date & Time Provider Department Dept. Phone    7/31/2017 Rachelle Vela  "MD [facility] [phone]      Your Vitals Were     Blood Pressure Respirations Last Period Pulse Oximetry          125/85 14 2005 98%        Media RedefinedharPointstic Information     Mobile Security Software lets you send messages to your doctor, view your test results, renew your prescriptions, schedule appointments and more. To sign up, go to [email or web address] . Click on \"Log in\" on the left side of the screen, which will take you to the Welcome page. Then click on \"Sign up Now\" on the right side of the page.     You will be asked to enter the access code listed below, as well as some personal information. Please follow the directions to create your username and password.     Your access code is: [email or web address]  Expires: 10/29/2017 11:46 AM     Your access code will  in 90 days. If you need help or a new code, please call your [place] clinic or [phone].        Care EveryWhere ID     This is your Care EveryWhere ID. This could be used by other organizations to access your [place] medical records  [id number]        Equal Access to Services     JAYY PEMBERTON : Hadefrem Charles, ignacio hightower, qamorenita james, ela correa . So [facility] [phone].    ATENCIÓN: Si habla español, tiene a castaneda disposición servicios gratuitos de asistencia lingüística. Pippa al [phone].    We comply with applicable federal civil rights laws and [place] laws. We do not discriminate on the basis of race, color, national origin, age, disability sex, sexual orientation or gender identity.               Review of your medicines      CONTINUE these medicines which have NOT CHANGED        Dose / Directions    aspirin 81 MG tablet   Used for:  Type II or unspecified type diabetes mellitus without mention of complication, not stated as uncontrolled        Dose:  81 mg   Take 1 tablet (81 mg) by mouth daily   Refills:  0       atorvastatin 20 MG tablet   Commonly known as:  " LIPITOR   Used for:  Mixed hyperlipidemia        Dose:  20 mg   Take 1 tablet (20 mg) by mouth daily   Quantity:  90 tablet   Refills:  1       ibuprofen 600 MG tablet   Commonly known as:  ADVIL/MOTRIN        Dose:  400 mg   Take 400 mg by mouth 3 times daily as needed   Refills:  0       linagliptin-metFORMIN ER 5-1000 MG per tablet   Commonly known as:  JENTADUETO XR   Used for:  Type 2 diabetes mellitus with complication, without long-term current use of insulin (H)        Dose:  1 tablet   Take 1 tablet by mouth daily with food   Quantity:  90 tablet   Refills:  0       valsartan-hydrochlorothiazide 160-12.5 MG per tablet   Commonly known as:  DIOVAN HCT   Used for:  Essential hypertension, benign, Type 2 diabetes mellitus with complication, without long-term current use of insulin (H)        Dose:  1 tablet   Take 1 tablet by mouth daily   Quantity:  90 tablet   Refills:  1                Protect others around you: Learn how to safely use, store and throw away your medicines at www.disposemymeds.org.             Medication List: This is a list of all your medications and when to take them. Check marks below indicate your daily home schedule. Keep this list as a reference.      Medications           Morning Afternoon Evening Bedtime As Needed    aspirin 81 MG tablet   Take 1 tablet (81 mg) by mouth daily                                atorvastatin 20 MG tablet   Commonly known as:  LIPITOR   Take 1 tablet (20 mg) by mouth daily                                ibuprofen 600 MG tablet   Commonly known as:  ADVIL/MOTRIN   Take 400 mg by mouth 3 times daily as needed                                linagliptin-metFORMIN ER 5-1000 MG per tablet   Commonly known as:  JENTADUETO XR   Take 1 tablet by mouth daily with food                                valsartan-hydrochlorothiazide 160-12.5 MG per tablet   Commonly known as:  DIOVAN HCT   Take 1 tablet by mouth daily

## 2017-09-30 DIAGNOSIS — E11.8 TYPE 2 DIABETES MELLITUS WITH COMPLICATION, WITHOUT LONG-TERM CURRENT USE OF INSULIN (H): ICD-10-CM

## 2017-09-30 DIAGNOSIS — I10 ESSENTIAL HYPERTENSION, BENIGN: ICD-10-CM

## 2017-09-30 DIAGNOSIS — E78.2 MIXED HYPERLIPIDEMIA: ICD-10-CM

## 2017-10-02 RX ORDER — VALSARTAN AND HYDROCHLOROTHIAZIDE 160; 12.5 MG/1; MG/1
TABLET, FILM COATED ORAL
Qty: 90 TABLET | OUTPATIENT
Start: 2017-10-02

## 2017-10-02 RX ORDER — ATORVASTATIN CALCIUM 20 MG/1
TABLET, FILM COATED ORAL
Qty: 90 TABLET | OUTPATIENT
Start: 2017-10-02

## 2017-10-02 NOTE — TELEPHONE ENCOUNTER
Refused Prescriptions:                       Disp   Refills    valsartan-hydrochlorothiazide (DIOVAN-HCT)*90 tab*         Sig: TAKE 1 TABLET BY MOUTH  DAILY  Refused By: MIRIAM MANUEL  Reason for Refusal: Patient needs appointment    atorvastatin (LIPITOR) 20 MG tablet [Pharm*90 tab*         Sig: TAKE 1 TABLET BY MOUTH  DAILY  Refused By: MIRIAM MANUEL  Reason for Refusal: Patient needs appointment    This was Mail Order  Pt is due for a fasting refill in November  [phone] (home)

## 2017-10-05 ENCOUNTER — TRANSFERRED RECORDS (OUTPATIENT)
Dept: FAMILY MEDICINE | Facility: CLINIC | Age: 62
End: 2017-10-05

## 2017-11-22 ENCOUNTER — OFFICE VISIT (OUTPATIENT)
Dept: FAMILY MEDICINE | Facility: CLINIC | Age: 62
End: 2017-11-22

## 2017-11-22 VITALS
HEIGHT: 63 IN | TEMPERATURE: 98.2 F | RESPIRATION RATE: 16 BRPM | WEIGHT: 135 LBS | OXYGEN SATURATION: 98 % | DIASTOLIC BLOOD PRESSURE: 80 MMHG | BODY MASS INDEX: 23.92 KG/M2 | HEART RATE: 76 BPM | SYSTOLIC BLOOD PRESSURE: 120 MMHG

## 2017-11-22 DIAGNOSIS — Z23 NEED FOR VACCINATION: ICD-10-CM

## 2017-11-22 DIAGNOSIS — E78.2 MIXED HYPERLIPIDEMIA: ICD-10-CM

## 2017-11-22 DIAGNOSIS — I10 ESSENTIAL HYPERTENSION, BENIGN: ICD-10-CM

## 2017-11-22 DIAGNOSIS — E11.8 TYPE 2 DIABETES MELLITUS WITH COMPLICATION, WITHOUT LONG-TERM CURRENT USE OF INSULIN (H): Primary | ICD-10-CM

## 2017-11-22 DIAGNOSIS — Z76.0 ENCOUNTER FOR MEDICATION REFILL: ICD-10-CM

## 2017-11-22 LAB — HBA1C MFR BLD: 6 % (ref 4–7)

## 2017-11-22 PROCEDURE — 90732 PPSV23 VACC 2 YRS+ SUBQ/IM: CPT | Performed by: FAMILY MEDICINE

## 2017-11-22 PROCEDURE — 80061 LIPID PANEL: CPT | Mod: 90 | Performed by: FAMILY MEDICINE

## 2017-11-22 PROCEDURE — 90471 IMMUNIZATION ADMIN: CPT | Performed by: FAMILY MEDICINE

## 2017-11-22 PROCEDURE — 80053 COMPREHEN METABOLIC PANEL: CPT | Mod: 90 | Performed by: FAMILY MEDICINE

## 2017-11-22 PROCEDURE — 36415 COLL VENOUS BLD VENIPUNCTURE: CPT | Performed by: FAMILY MEDICINE

## 2017-11-22 PROCEDURE — 83036 HEMOGLOBIN GLYCOSYLATED A1C: CPT | Performed by: FAMILY MEDICINE

## 2017-11-22 PROCEDURE — 99214 OFFICE O/P EST MOD 30 MIN: CPT | Mod: 25 | Performed by: FAMILY MEDICINE

## 2017-11-22 RX ORDER — VALSARTAN AND HYDROCHLOROTHIAZIDE 160; 12.5 MG/1; MG/1
1 TABLET, FILM COATED ORAL DAILY
Qty: 90 TABLET | Refills: 1 | Status: SHIPPED | OUTPATIENT
Start: 2017-11-22 | End: 2018-05-21

## 2017-11-22 RX ORDER — ATORVASTATIN CALCIUM 20 MG/1
20 TABLET, FILM COATED ORAL DAILY
Qty: 90 TABLET | Refills: 1 | Status: SHIPPED | OUTPATIENT
Start: 2017-11-22 | End: 2018-05-21

## 2017-11-22 NOTE — PROGRESS NOTES
SUBJECTIVE:  Shayla Winter is an 62 year old female who presents for evaluation and treatment   of hypertension and elevated cholesterol now getting her type 2 diabetes controlled and followed by Endocrinology. Last A1C was 6.5. Age at diagnosis 52. Family history   positive for diabetes in the patient s mother.   Previous treatment modalities employed include diet, oral agents, exercise and ASA.   Current treatment includes diet, oral agents, exercise and ASA.     Current monitoring regimen: home blood tests - once daily  Home blood sugar records:   Last HgbA1c: 6  Diabetic complications: peripheral neuropathy  Cardiovascular risk factors: family history, lipids, diabetes mellitus, hypertension, obesity and sedentary life style    Current Outpatient Prescriptions   Medication     atorvastatin (LIPITOR) 20 MG tablet     valsartan-hydrochlorothiazide (DIOVAN HCT) 160-12.5 MG per tablet     linagliptin-metFORMIN ER (JENTADUETO XR) 5-1000 MG per tablet     aspirin 81 MG tablet     GABRIELA CONTOUR NEXT test strip     ibuprofen (ADVIL,MOTRIN) 600 MG tablet     No current facility-administered medications for this visit.      Allergies   Allergen Reactions     Niacin Hives     hives,swollen     Adhesive Tape Rash       Social History   Substance Use Topics     Smoking status: Never Smoker     Smokeless tobacco: Never Used     Alcohol use 0.5 oz/week     1 Standard drinks or equivalent per week      Comment: once a week       Review Of Systems  Skin: negative  Eyes: negative  Ears/Nose/Throat: negative  Respiratory: No shortness of breath, dyspnea on exertion, cough, or hemoptysis  Cardiovascular: negative  Gastrointestinal: negative  Genitourinary: negative  Musculoskeletal: negative  Neurologic: negative  Psychiatric: negative  Hematologic/Lymphatic/Immunologic: negative  Endocrine: diabetes    OBJECTIVE:  /80 (BP Location: Left arm, Cuff Size: Adult Regular)  Pulse 76  Temp 98.2  F (36.8  C) (Oral)  Resp  "16  Ht 1.588 m (5' 2.5\")  Wt 61.2 kg (135 lb)  LMP 09/14/2005  SpO2 98%  BMI 24.3 kg/m2  General appearance: healthy, alert, no distress, cooperative, smiling and over weight  Skin: Skin color, texture, turgor normal. No rashes or lesions.  Eyes: conjunctivae/corneas clear. PERRL, EOM's intact. Fundi benign  Ears: negative  Oropharynx: Lips, mucosa, and tongue normal. Teeth and gums normal.  Neck: Neck supple. No adenopathy. Thyroid symmetric, normal size,, Carotids without bruits.  Lungs: negative, Percussion normal. Good diaphragmatic excursion. Lungs clear  Heart: negative, PMI normal. No lifts, heaves, or thrills. RRR. No murmurs, clicks gallops or rub  Abdomen: Abdomen soft, non-tender. BS normal. No masses, organomegaly  Extremities: Extremities normal. No deformities, edema, or skin discoloration.  Peripheral pulses: radial=4/4, femoral=4/4, popliteal=4/4, dorsalis pedis=4/4,  Neuro: Gait normal. Reflexes normal and symmetric. Sensation grossly WNL.  Weight loss noted  BMI : Body mass index is 24.3 kg/(m^2).    ASSESSMENT:(E11.8) Type 2 diabetes mellitus with complication, without long-term current use of insulin (H)  (primary encounter diagnosis)  Comment: await endocrine options  Plan: VENIPUNC FNGR,HEEL,EAR [24251], HEMOGLOBIN A1C,        COMPREHENSIVE METABOLIC PANEL, LIPID PANEL,         valsartan-hydrochlorothiazide (DIOVAN HCT)         160-12.5 MG per tablet        Reviewed concepts of diabetes self-management stressing the primary   role of the patient in monitoring and maintaining control of   diabetes.      (I10) Essential hypertension, benign  Plan: VENIPUNC FNGR,HEEL,EAR [06079], COMPREHENSIVE         METABOLIC PANEL, valsartan-hydrochlorothiazide         (DIOVAN HCT) 160-12.5 MG per tablet        1)  Medication: continue current medication regimen unchanged  2)  Dietary sodium restriction  3)  Regular aerobic exercise  4)  Recheck in 6 months, sooner should new symptoms or   problems " arise.    Patient Education: Reviewed risks of hypertension and principles of   treatment.        (E78.2) Mixed hyperlipidemia  Plan: VENIPUNC FNGR,HEEL,EAR [[id number]], LIPID PANEL,         atorvastatin (LIPITOR) 20 MG tablet        1)  Medication: continue current medication regimen unchanged  2)  Dietary sodium restriction  3)  Regular aerobic exercise  4)  Recheck in 6 months, sooner should new symptoms or   problems arise.    Patient Education: Reviewed risks of hypertension and principles of   treatment.        (Z23) Need for vaccination  Plan: PNEUMOCOCCAL VACCINE,ADULT,SQ OR IM, VACCINE         ADMINISTRATION, INITIAL            (Z76.0) Encounter for medication refill  Plan: VENIPUNC FNGR,HEEL,EAR [80643], HEMOGLOBIN A1C,        COMPREHENSIVE METABOLIC PANEL, LIPID PANEL

## 2017-11-22 NOTE — PATIENT INSTRUCTIONS
Reviewed concepts of diabetes self-management stressing the primary   role of the patient in monitoring and maintaining control of   diabetes.    1)  Medication: continue current medication regimen unchanged  2)  Low fat, low cholesterol diet and low salt  3)  Regular aerobic exercise  4)  Recheck in 6 months, sooner should new symptoms or   problems arise.    Patient Education: Reviewed risks of elevated lipids and principles   of treatment.

## 2017-11-22 NOTE — LETTER
November 24, 2017      Shayla Winter  [address]  [place] [address]        Dear ,    We are writing to inform you of your test results.    Your test results fall within the expected range(s) or remain unchanged from previous results.  Please continue with current treatment plan. See your elevated glucose as expected in type 2 diabetes.    Resulted Orders   HEMOGLOBIN A1C   Result Value Ref Range    Hemoglobin A1C 6.0 4.0 - 7.0 %   COMPREHENSIVE METABOLIC PANEL   Result Value Ref Range    Glucose 137 (H) 65 - 99 mg/dL      Comment:                    Fasting reference interval     For someone without known diabetes, a glucose  value >125 mg/dL indicates that they may have  diabetes and this should be confirmed with a  follow-up test.         Urea Nitrogen 12 7 - 25 mg/dL    Creatinine 0.53 0.50 - 0.99 mg/dL      Comment:      For patients >49 years of age, the reference limit  for Creatinine is approximately 13% higher for people  identified as -American.         GFR Estimate 102 > OR = 60 mL/min/1.73m2    EGFR African American 119 > OR = 60 mL/min/1.73m2    BUN/Creatinine Ratio NOT APPLICABLE 6 - 22 (calc)    Sodium 140 135 - 146 mmol/L    Potassium 4.0 3.5 - 5.3 mmol/L    Chloride 103 98 - 110 mmol/L    Carbon Dioxide 26 20 - 31 mmol/L    Calcium 9.4 8.6 - 10.4 mg/dL    Protein Total 6.8 6.1 - 8.1 g/dL    Albumin 4.5 3.6 - 5.1 g/dL    Globulin Calculated 2.3 1.9 - 3.7 g/dL (calc)    A/G Ratio 2.0 1.0 - 2.5 (calc)    Bilirubin Total 0.5 0.2 - 1.2 mg/dL    Alkaline Phosphatase 57 33 - 130 U/L    AST 11 10 - 35 U/L    ALT 12 6 - 29 U/L   LIPID PANEL   Result Value Ref Range    Cholesterol 173 <200 mg/dL    HDL Cholesterol 59 >50 mg/dL    Triglycerides 113 <150 mg/dL    LDL Cholesterol Calculated 93 mg/dL (calc)      Comment:      Reference range: <100     Desirable range <100 mg/dL for patients with CHD or  diabetes and <70 mg/dL for diabetic patients with  known heart disease.      LDL-C is now calculated using the Vincent-Moura   calculation, which is a validated novel method providing   better accuracy than the Friedewald equation in the   estimation of LDL-C.   Vincent SS et al. ROSSANA. 2013;310(19): 9408-9043   (http://education.[employer]"/faq/UOC667)      Cholesterol/HDL Ratio 2.9 <5.0 (calc)    Non HDL Cholesterol 114 <130 mg/dL (calc)      Comment:      For patients with diabetes plus 1 major ASCVD risk   factor, treating to a non-HDL-C goal of <100 mg/dL   (LDL-C of <70 mg/dL) is considered a therapeutic   option.         If you have any questions or concerns, please call the clinic at the number listed above.       Sincerely,        Latisha Flanagan MD

## 2017-11-22 NOTE — NURSING NOTE
Patient is here for a recheck of their medication.  Pre-Visit Screening :  Immunizations : up to date    Colonoscopy : is up to date  Mammogram : is up to date  Asthma Action Test/Plan : roberto  PHQ9/GAD7 :  na  Pulse - regular    Medication Reconciliation: complete      CLASSIFICATION OF OVERWEIGHT AND OBESITY BY BMI                         Obesity Class           BMI(kg/m2)  Underweight                                    < 18.5  Normal                                         18.5-24.9  Overweight                                     25.0-29.9  OBESITY                     I                  30.0-34.9                              II                 35.0-39.9  EXTREME OBESITY             III                >40                             Patient's  BMI Body mass index is 24.3 kg/(m^2).  http://hin.nhlbi.nih.gov/menuplanner/menu.cgi  Questioned patient about current smoking habits.  Pt. has never smoked.  The patient has verbalized that it is ok to leave a detailed voice message on the patient's cell phone with results/recommendations from this visit.

## 2017-11-22 NOTE — MR AVS SNAPSHOT
After Visit Summary   11/22/2017    Shayla Winter    MRN: [id number]           Patient Information     Date Of Birth          1955        Visit Information        Provider Department      11/22/2017 9:00 AM Latisha Flanagan MD [facility]        Today's Diagnoses     Type 2 diabetes mellitus with complication, without long-term current use of insulin (H)    -  1    Essential hypertension, benign        Mixed hyperlipidemia        Need for vaccination        Encounter for medication refill          Care Instructions    Reviewed concepts of diabetes self-management stressing the primary   role of the patient in monitoring and maintaining control of   diabetes.    1)  Medication: continue current medication regimen unchanged  2)  Low fat, low cholesterol diet and low salt  3)  Regular aerobic exercise  4)  Recheck in 6 months, sooner should new symptoms or   problems arise.    Patient Education: Reviewed risks of elevated lipids and principles   of treatment.              Follow-ups after your visit        Follow-up notes from your care team     Return in about 6 months (around 5/22/2018).      Who to contact     If you have questions or need follow up information about today's clinic visit or your schedule please contact [facility] directly at [phone].  Normal or non-critical lab and imaging results will be communicated to you by MyChart, letter or phone within 4 business days after the clinic has received the results. If you do not hear from us within 7 days, please contact the clinic through [email or web address] or phone. If you have a critical or abnormal lab result, we will notify you by phone as soon as possible.  Submit refill requests through Zeolife or call your pharmacy and they will forward the refill request to us. Please allow 3 business days for your refill to be completed.          Additional Information About Your Visit        MyChart  "Information     Tribotek lets you send messages to your doctor, view your test results, renew your prescriptions, schedule appointments and more. To sign up, go to [email or web address] . Click on \"Log in\" on the left side of the screen, which will take you to the Welcome page. Then click on \"Sign up Now\" on the right side of the page.     You will be asked to enter the access code listed below, as well as some personal information. Please follow the directions to create your username and password.     Your access code is: [email or web address]  Expires: 2018 10:28 AM     Your access code will  in 90 days. If you need help or a new code, please call your [place] clinic or [phone].        Care EveryWhere ID     This is your Care EveryWhere ID. This could be used by other organizations to access your [facility] medical records  [id number]        Your Vitals Were     Pulse Temperature Respirations Height Last Period Pulse Oximetry    76 98.2  F (36.8  C) (Oral) 16 1.588 m (5' 2.5\") 2005 98%    BMI (Body Mass Index)                   24.3 kg/m2            Blood Pressure from Last 3 Encounters:   17 120/80   17 127/79   17 132/82    Weight from Last 3 Encounters:   17 61.2 kg (135 lb)   17 73.9 kg (163 lb)   17 73 kg (161 lb)              We Performed the Following     COMPREHENSIVE METABOLIC PANEL     HEMOGLOBIN A1C     LIPID PANEL     PNEUMOCOCCAL VACCINE,ADULT,SQ OR IM     VACCINE ADMINISTRATION, INITIAL     VENIPUNC FNGR,HEEL,EAR [[id number]]          Where to get your medicines      These medications were sent to [facility] - [address], [facility] [address]     Phone:  [phone]     atorvastatin 20 MG tablet    valsartan-hydrochlorothiazide 160-12.5 MG per tablet          Primary Care Provider Office Phone # Fax #    Latisha Flanagan [phone]       [address]  [facility] [address]        Equal Access to Services     RAMSES PEMBERTON : Hadii geronimo jackson katybrad Mistyali, wahoseada reguloanicetoha, qarumata kirandeondrecarol james, ela botelloantolinbev correa . So [facility] [phone].    ATENCIÓN: Si habla español, tiene a castaneda disposición servicios gratuitos de asistencia lingüística. Llame al [phone].    We comply with applicable federal civil rights laws and [place] laws. We do not discriminate on the basis of race, color, national origin, age, disability, sex, sexual orientation, or gender identity.            Thank you!     Thank you for choosing [facility]  for your care. Our goal is always to provide you with excellent care. Hearing back from our patients is one way we can continue to improve our services. Please take a few minutes to complete the written survey that you may receive in the mail after your visit with us. Thank you!             Your Updated Medication List - Protect others around you: Learn how to safely use, store and throw away your medicines at www.disposemymeds.org.          This list is accurate as of: 11/22/17 10:28 AM.  Always use your most recent med list.                   Brand Name Dispense Instructions for use Diagnosis    aspirin 81 MG tablet      Take 1 tablet (81 mg) by mouth daily    Type II or unspecified type diabetes mellitus without mention of complication, not stated as uncontrolled       atorvastatin 20 MG tablet    LIPITOR    90 tablet    Take 1 tablet (20 mg) by mouth daily    Mixed hyperlipidemia       GABRIELA CONTOUR NEXT test strip   Generic drug:  blood glucose monitoring      TEST 1-2 TIMES QD        ibuprofen 600 MG tablet    ADVIL/MOTRIN     Take 400 mg by mouth 3 times daily as needed        linagliptin-metFORMIN ER 5-1000 MG per tablet    JENTADUETO XR    90 tablet    Take 1 tablet by mouth daily with food    Type 2 diabetes mellitus with complication, without long-term current use of insulin (H)        valsartan-hydrochlorothiazide 160-12.5 MG per tablet    DIOVAN HCT    90 tablet    Take 1 tablet by mouth daily    Essential hypertension, benign, Type 2 diabetes mellitus with complication, without long-term current use of insulin (H)

## 2017-11-23 LAB
ALBUMIN SERPL-MCNC: 4.5 G/DL (ref 3.6–5.1)
ALBUMIN/GLOB SERPL: 2 (CALC) (ref 1–2.5)
ALP SERPL-CCNC: 57 U/L (ref 33–130)
ALT SERPL-CCNC: 12 U/L (ref 6–29)
AST SERPL-CCNC: 11 U/L (ref 10–35)
BILIRUB SERPL-MCNC: 0.5 MG/DL (ref 0.2–1.2)
BUN SERPL-MCNC: 12 MG/DL (ref 7–25)
BUN/CREATININE RATIO: ABNORMAL (CALC) (ref 6–22)
CALCIUM SERPL-MCNC: 9.4 MG/DL (ref 8.6–10.4)
CHLORIDE SERPLBLD-SCNC: 103 MMOL/L (ref 98–110)
CHOLEST SERPL-MCNC: 173 MG/DL
CHOLEST/HDLC SERPL: 2.9 (CALC)
CO2 SERPL-SCNC: 26 MMOL/L (ref 20–31)
CREAT SERPL-MCNC: 0.53 MG/DL (ref 0.5–0.99)
EGFR AFRICAN AMERICAN - QUEST: 119 ML/MIN/1.73M2
GFR SERPL CREATININE-BSD FRML MDRD: 102 ML/MIN/1.73M2
GLOBULIN, CALCULATED - QUEST: 2.3 G/DL (CALC) (ref 1.9–3.7)
GLUCOSE - QUEST: 137 MG/DL (ref 65–99)
HDLC SERPL-MCNC: 59 MG/DL
LDLC SERPL CALC-MCNC: 93 MG/DL (CALC)
NONHDLC SERPL-MCNC: 114 MG/DL (CALC)
POTASSIUM SERPL-SCNC: 4 MMOL/L (ref 3.5–5.3)
PROT SERPL-MCNC: 6.8 G/DL (ref 6.1–8.1)
SODIUM SERPL-SCNC: 140 MMOL/L (ref 135–146)
TRIGL SERPL-MCNC: 113 MG/DL

## 2017-12-08 ENCOUNTER — TRANSFERRED RECORDS (OUTPATIENT)
Dept: FAMILY MEDICINE | Facility: CLINIC | Age: 62
End: 2017-12-08

## 2018-03-29 DIAGNOSIS — I10 ESSENTIAL HYPERTENSION, BENIGN: ICD-10-CM

## 2018-03-29 DIAGNOSIS — E78.2 MIXED HYPERLIPIDEMIA: ICD-10-CM

## 2018-03-29 DIAGNOSIS — E11.8 TYPE 2 DIABETES MELLITUS WITH COMPLICATION, WITHOUT LONG-TERM CURRENT USE OF INSULIN (H): ICD-10-CM

## 2018-03-29 RX ORDER — VALSARTAN AND HYDROCHLOROTHIAZIDE 160; 12.5 MG/1; MG/1
TABLET, FILM COATED ORAL
Qty: 90 TABLET | OUTPATIENT
Start: 2018-03-29

## 2018-03-29 RX ORDER — ATORVASTATIN CALCIUM 20 MG/1
TABLET, FILM COATED ORAL
Qty: 90 TABLET | OUTPATIENT
Start: 2018-03-29

## 2018-03-29 NOTE — TELEPHONE ENCOUNTER
Refused Prescriptions:                       Disp   Refills    atorvastatin (LIPITOR) 20 MG tablet [Pharm*90 tab*         Sig: TAKE 1 TABLET BY MOUTH  DAILY  Refused By: MIRIAM MANUEL  Reason for Refusal: Patient needs appointment    valsartan-hydrochlorothiazide (DIOVAN-HCT)*90 tab*         Sig: TAKE 1 TABLET BY MOUTH  DAILY  Refused By: MIRIAM MANUEL  Reason for Refusal: Patient needs appointment    Refilled 11- for 6 months pt due for ov in May 2018  [place]  [phone] (home)

## 2018-03-30 ENCOUNTER — OFFICE VISIT (OUTPATIENT)
Dept: FAMILY MEDICINE | Facility: CLINIC | Age: 63
End: 2018-03-30

## 2018-03-30 VITALS
SYSTOLIC BLOOD PRESSURE: 130 MMHG | HEIGHT: 63 IN | WEIGHT: 147.6 LBS | RESPIRATION RATE: 16 BRPM | OXYGEN SATURATION: 99 % | HEART RATE: 94 BPM | DIASTOLIC BLOOD PRESSURE: 78 MMHG | TEMPERATURE: 99 F | BODY MASS INDEX: 26.15 KG/M2

## 2018-03-30 DIAGNOSIS — E11.8 TYPE 2 DIABETES MELLITUS WITH COMPLICATION, WITHOUT LONG-TERM CURRENT USE OF INSULIN (H): Primary | ICD-10-CM

## 2018-03-30 LAB — HBA1C MFR BLD: 6.2 % (ref 4–7)

## 2018-03-30 PROCEDURE — 99213 OFFICE O/P EST LOW 20 MIN: CPT | Performed by: FAMILY MEDICINE

## 2018-03-30 PROCEDURE — 83036 HEMOGLOBIN GLYCOSYLATED A1C: CPT | Performed by: FAMILY MEDICINE

## 2018-03-30 PROCEDURE — 36415 COLL VENOUS BLD VENIPUNCTURE: CPT | Performed by: FAMILY MEDICINE

## 2018-03-30 RX ORDER — METFORMIN HCL 500 MG
1000 TABLET, EXTENDED RELEASE 24 HR ORAL
COMMUNITY
End: 2020-05-19

## 2018-03-30 NOTE — PROGRESS NOTES
SUBJECTIVE:  Shayla Winter is an 62 year old female who presents for evaluation and treatment   of Type 2 diabetes mellitus. Age at diagnosis 52. Family history   positive for diabetes in the patient s mother.   Previous treatment modalities employed include diet, oral agents, exercise and ASA.   Current treatment includes diet, oral agents, exercise and ASA.     Current monitoring regimen: home blood tests - rarely  Home blood sugar records:   Last HgbA1c: 6.2  Diabetic complications: peripheral neuropathy  Cardiovascular risk factors: family history, lipids, diabetes mellitus, hypertension, obesity and sedentary life style    Current Outpatient Prescriptions   Medication     metFORMIN (GLUCOPHAGE-XR) 500 MG 24 hr tablet     atorvastatin (LIPITOR) 20 MG tablet     valsartan-hydrochlorothiazide (DIOVAN HCT) 160-12.5 MG per tablet     linagliptin-metFORMIN ER (JENTADUETO XR) 5-1000 MG per tablet     aspirin 81 MG tablet     GABRIELA CONTOUR NEXT test strip     ibuprofen (ADVIL,MOTRIN) 600 MG tablet     No current facility-administered medications for this visit.      Allergies   Allergen Reactions     Niacin Hives     hives,swollen     Adhesive Tape Rash       Social History   Substance Use Topics     Smoking status: Never Smoker     Smokeless tobacco: Never Used     Alcohol use 0.5 oz/week     1 Standard drinks or equivalent per week      Comment: once a week       Review Of Systems  Skin: negative  Eyes: negative  Ears/Nose/Throat: negative  Respiratory: No shortness of breath, dyspnea on exertion, cough, or hemoptysis  Cardiovascular: hypertension and elevated cholesterol well controlled  Gastrointestinal: negative  Genitourinary: negative  Musculoskeletal: negative  Neurologic: negative  Psychiatric: negative  Hematologic/Lymphatic/Immunologic: negative  Endocrine: diabetes    OBJECTIVE:  /78 (BP Location: Left arm, Patient Position: Chair, Cuff Size: Adult Large)  Pulse 94  Temp 99  F (37.2  C)  "(Oral)  Ht 1.588 m (5' 2.5\")  Wt 67 kg (147 lb 9.6 oz)  LMP 09/29/2005  SpO2 99%  Breastfeeding? No  BMI 26.57 kg/m2  General appearance: healthy, alert, no distress, cooperative, smiling and over weight  Skin: Skin color, texture, turgor normal. No rashes or lesions.  Eyes: conjunctivae/corneas clear. PERRL, EOM's intact. Fundi benign  Ears: negative  Oropharynx: Lips, mucosa, and tongue normal. Teeth and gums normal.  Neck: Neck supple. No adenopathy. Thyroid symmetric, normal size,, Carotids without bruits.  Lungs: negative, Percussion normal. Good diaphragmatic excursion. Lungs clear  Heart: negative, PMI normal. No lifts, heaves, or thrills. RRR. No murmurs, clicks gallops or rub  Abdomen: Abdomen soft, non-tender. BS normal. No masses, organomegaly  Extremities: Extremities normal. No deformities, edema, or skin discoloration.  Peripheral pulses: radial=4/4, femoral=4/4, popliteal=4/4, dorsalis pedis=4/4,  Neuro: Gait normal. Reflexes normal and symmetric. Sensation grossly WNL. Normal monofilament  BMI : Body mass index is 26.57 kg/(m^2).      ASSESSMENT:  Diabetes mellitus - Type 2, uncomplicated  (E11.8) Type 2 diabetes mellitus with complication, without long-term current use of insulin (H)  (primary encounter diagnosis)  Plan: Hemoglobin A1c (BFP), VENOUS COLLECTION, C FOOT        EXAM  NO CHARGE        Recheck 5/2018      PE:   Reviewed concepts of diabetes self-management stressing the primary   role of the patient in monitoring and maintaining control of   diabetes.  "

## 2018-03-30 NOTE — PATIENT INSTRUCTIONS
Diabetes mellitus - Type 2, uncomplicated  (E11.8) Type 2 diabetes mellitus with complication, without long-term current use of insulin (H)  (primary encounter diagnosis)  Plan: Hemoglobin A1c (BFP), VENOUS COLLECTION, C FOOT        EXAM  NO CHARGE        Recheck 5/2018

## 2018-03-30 NOTE — MR AVS SNAPSHOT
After Visit Summary   3/30/2018    Shayla Winter    MRN: [id number]           Patient Information     Date Of Birth          1955        Visit Information        Provider Department      3/30/2018 11:15 AM Latisha Flanagan MD [facility]        Today's Diagnoses     Type 2 diabetes mellitus with complication, without long-term current use of insulin (H)    -  1      Care Instructions    Diabetes mellitus - Type 2, uncomplicated  (E11.8) Type 2 diabetes mellitus with complication, without long-term current use of insulin (H)  (primary encounter diagnosis)  Plan: Hemoglobin A1c (BFP), VENOUS COLLECTION, C FOOT        EXAM  NO CHARGE        Recheck 5/2018            Follow-ups after your visit        Follow-up notes from your care team     Return in about 2 months (around 5/30/2018).      Your next 10 appointments already scheduled     May 21, 2018  8:00 AM CDT   Physical-Complete with Latisha Flanagan MD   [facility] ([facility])    [address]  [address]  [facility] [id number] [phone]           Instruct patient that they should have nothing(except water) after midnight the evening prior to the appointment.              Who to contact     If you have questions or need follow up information about today's clinic visit or your schedule please contact [facility] directly at [phone].  Normal or non-critical lab and imaging results will be communicated to you by MyChart, letter or phone within 4 business days after the clinic has received the results. If you do not hear from us within 7 days, please contact the clinic through MyChart or phone. If you have a critical or abnormal lab result, we will notify you by phone as soon as possible.  Submit refill requests through [employer] or call your pharmacy and they will forward the refill request to us. Please allow 3 business days for your refill  "to be completed.          Additional Information About Your Visit        Care EveryWhere ID     This is your Care EveryWhere ID. This could be used by other organizations to access your [place] medical records  [id number]        Your Vitals Were     Pulse Temperature Respirations Height Last Period Pulse Oximetry    94 99  F (37.2  C) (Oral) 16 1.588 m (5' 2.5\") 09/29/2005 99%    Breastfeeding? BMI (Body Mass Index)                No 26.57 kg/m2           Blood Pressure from Last 3 Encounters:   03/30/18 130/78   11/22/17 120/80   07/31/17 127/79    Weight from Last 3 Encounters:   03/30/18 67 kg (147 lb 9.6 oz)   11/22/17 61.2 kg (135 lb)   05/31/17 73.9 kg (163 lb)              We Performed the Following     C FOOT EXAM  NO CHARGE     Hemoglobin A1c (BFP)     VENOUS COLLECTION        Primary Care Provider Office Phone # Fax #    Latisha Flanagan [phone]       [facility] [address] [address]        Equal Access to Services     [facility]: Hadii aad ku hadasho Soomaali, waaxda luqadaha, qaybta kaalmada adeegyacarol, ela correa . So [facility] [phone].    ATENCIÓN: Si habla español, tiene a castaneda disposición servicios gratuitos de asistencia lingüística. Pippa al [phone].    We comply with applicable federal civil rights laws and [place] laws. We do not discriminate on the basis of race, color, national origin, age, disability, sex, sexual orientation, or gender identity.            Thank you!     Thank you for choosing [facility]  for your care. Our goal is always to provide you with excellent care. Hearing back from our patients is one way we can continue to improve our services. Please take a few minutes to complete the written survey that you may receive in the mail after your visit with us. Thank you!             Your Updated Medication List - Protect others around you: Learn how to safely use, store and throw away " your medicines at www.disposemymeds.org.          This list is accurate as of 3/30/18 12:24 PM.  Always use your most recent med list.                   Brand Name Dispense Instructions for use Diagnosis    aspirin 81 MG tablet      Take 1 tablet (81 mg) by mouth daily    Type II or unspecified type diabetes mellitus without mention of complication, not stated as uncontrolled       atorvastatin 20 MG tablet    LIPITOR    90 tablet    Take 1 tablet (20 mg) by mouth daily    Mixed hyperlipidemia       GABRIELA CONTOUR NEXT test strip   Generic drug:  blood glucose monitoring      TEST 1-2 TIMES QD        ibuprofen 600 MG tablet    ADVIL/MOTRIN     Take 400 mg by mouth 3 times daily as needed        linagliptin-metFORMIN ER 5-1000 MG per tablet    JENTADUETO XR    90 tablet    Take 1 tablet by mouth daily with food    Type 2 diabetes mellitus with complication, without long-term current use of insulin (H)       metFORMIN 500 MG 24 hr tablet    GLUCOPHAGE-XR     Take 1,000 mg by mouth daily (with dinner)        valsartan-hydrochlorothiazide 160-12.5 MG per tablet    DIOVAN HCT    90 tablet    Take 1 tablet by mouth daily    Essential hypertension, benign, Type 2 diabetes mellitus with complication, without long-term current use of insulin (H)

## 2018-03-30 NOTE — NURSING NOTE
Shayla Winter is here today for a non fasting medication recheck.    Pre-visit Screening:    Immunizations:  up to date  Colonoscopy:  is up to date  Mammogram: is up to date  Asthma Action Test/Plan:  NA  PHQ9:  NA  GAD7:  NA    Questioned patient about current smoking habits Pt. has never smoked.    Is it ok to leave a detailed message on home or cell phone's voice mail for today's visit only? Yes   Phone # [phone] (home) or [phone] (cell)      Divina Valero CMA

## 2018-05-21 ENCOUNTER — OFFICE VISIT (OUTPATIENT)
Dept: FAMILY MEDICINE | Facility: CLINIC | Age: 63
End: 2018-05-21

## 2018-05-21 VITALS
RESPIRATION RATE: 20 BRPM | DIASTOLIC BLOOD PRESSURE: 90 MMHG | WEIGHT: 149.6 LBS | HEART RATE: 80 BPM | OXYGEN SATURATION: 99 % | TEMPERATURE: 99 F | HEIGHT: 63 IN | BODY MASS INDEX: 26.51 KG/M2 | SYSTOLIC BLOOD PRESSURE: 158 MMHG

## 2018-05-21 DIAGNOSIS — I10 ESSENTIAL HYPERTENSION, BENIGN: ICD-10-CM

## 2018-05-21 DIAGNOSIS — E11.8 TYPE 2 DIABETES MELLITUS WITH COMPLICATION, WITHOUT LONG-TERM CURRENT USE OF INSULIN (H): ICD-10-CM

## 2018-05-21 DIAGNOSIS — Z76.0 ENCOUNTER FOR MEDICATION REFILL: ICD-10-CM

## 2018-05-21 DIAGNOSIS — E78.2 MIXED HYPERLIPIDEMIA: ICD-10-CM

## 2018-05-21 DIAGNOSIS — Z01.411 ENCOUNTER FOR GYNECOLOGICAL EXAMINATION WITH ABNORMAL FINDING: Primary | ICD-10-CM

## 2018-05-21 LAB
ALBUMIN URINE MG/G CR: 30 MG/G CREATININE
ALBUMIN URINE MG/SPEC: 10
CREATININE URINE: 50
HBA1C MFR BLD: 6.7 % (ref 4–7)
HEMOGLOBIN: 14.3 G/DL (ref 11.7–15.7)

## 2018-05-21 PROCEDURE — 83036 HEMOGLOBIN GLYCOSYLATED A1C: CPT | Performed by: FAMILY MEDICINE

## 2018-05-21 PROCEDURE — 80061 LIPID PANEL: CPT | Mod: 90 | Performed by: FAMILY MEDICINE

## 2018-05-21 PROCEDURE — 99396 PREV VISIT EST AGE 40-64: CPT | Performed by: FAMILY MEDICINE

## 2018-05-21 PROCEDURE — 85018 HEMOGLOBIN: CPT | Performed by: FAMILY MEDICINE

## 2018-05-21 PROCEDURE — 88142 CYTOPATH C/V THIN LAYER: CPT | Mod: 90 | Performed by: FAMILY MEDICINE

## 2018-05-21 PROCEDURE — 82043 UR ALBUMIN QUANTITATIVE: CPT | Performed by: FAMILY MEDICINE

## 2018-05-21 PROCEDURE — 80053 COMPREHEN METABOLIC PANEL: CPT | Mod: 90 | Performed by: FAMILY MEDICINE

## 2018-05-21 PROCEDURE — 36415 COLL VENOUS BLD VENIPUNCTURE: CPT | Performed by: FAMILY MEDICINE

## 2018-05-21 RX ORDER — ATORVASTATIN CALCIUM 20 MG/1
20 TABLET, FILM COATED ORAL DAILY
Qty: 90 TABLET | Refills: 1 | Status: SHIPPED | OUTPATIENT
Start: 2018-05-21 | End: 2018-11-16

## 2018-05-21 RX ORDER — VALSARTAN AND HYDROCHLOROTHIAZIDE 160; 12.5 MG/1; MG/1
1 TABLET, FILM COATED ORAL DAILY
Qty: 90 TABLET | Refills: 1 | Status: SHIPPED | OUTPATIENT
Start: 2018-05-21 | End: 2018-11-16

## 2018-05-21 NOTE — PATIENT INSTRUCTIONS
Total calcium intake of 1500 mgm/day, vitamin D 400-800IU/day and a regular weight bearing exercise program for prevention of osteoporosis is recommended treatment at this time.    Back to Endocrinology    1)  Medication: continue current medication regimen unchanged  2)  Low fat, low cholesterol diet and low salt  3)  Regular aerobic exercise  4)  Recheck in 6 months, sooner should new symptoms or   problems arise.    Patient Education: Reviewed risks of elevated lipids and principles   of treatment.

## 2018-05-21 NOTE — NURSING NOTE
Shaylalulu Winter is here for a full physical exam.     FASTING: Yes HOURS: 10+    Pre-Visit Screening :    Immunizations : up to date  Colon Screening : is up to date  Mammogram: is up to date  Asthma Action Test/Plan : NA  PHQ2 :  is completed today (Score: 0)  GAD7 :  NA    Patient's  BMI Body mass index is 26.5 kg/(m^2).    Questioned patient about current smoking habits.  Pt. has never smoked.    ETOH screening:    Questions:  1-How often do you have a drink containing alcohol?                             2 times per month(s)  2-How many drinks containing alcohol do you have on a typical day when you are         Drinking?                             1   3- How often do you have 5 or more drinks on one occasion?                             Never       Is it okay to leave a detailed message on cell phone's voicemail regarding today's visit? Yes    Telephone Information:   Mobile [phone]       Roomed By: Divina Valero CMA

## 2018-05-21 NOTE — MR AVS SNAPSHOT
After Visit Summary   5/21/2018    Shayla Winter    MRN: [id number]           Patient Information     Date Of Birth          1955        Visit Information        Provider Department      5/21/2018 8:00 AM Latisha Flanagan MD [facility]        Today's Diagnoses     Encounter for gynecological examination with abnormal finding    -  1    Type 2 diabetes mellitus with complication, without long-term current use of insulin (H)        Mixed hyperlipidemia        Essential hypertension, benign        Encounter for medication refill          Care Instructions    Total calcium intake of 1500 mgm/day, vitamin D 400-800IU/day and a regular weight bearing exercise program for prevention of osteoporosis is recommended treatment at this time.    Back to Endocrinology    1)  Medication: continue current medication regimen unchanged  2)  Low fat, low cholesterol diet and low salt  3)  Regular aerobic exercise  4)  Recheck in 6 months, sooner should new symptoms or   problems arise.    Patient Education: Reviewed risks of elevated lipids and principles   of treatment.              Follow-ups after your visit        Follow-up notes from your care team     Return in about 6 months (around 11/21/2018), or if symptoms worsen or fail to improve.      Who to contact     If you have questions or need follow up information about today's clinic visit or your schedule please contact [facility] directly at [phone].  Normal or non-critical lab and imaging results will be communicated to you by MyChart, letter or phone within 4 business days after the clinic has received the results. If you do not hear from us within 7 days, please contact the clinic through MyChart or phone. If you have a critical or abnormal lab result, we will notify you by phone as soon as possible.  Submit refill requests through [employer] or call your pharmacy and they will forward the refill request  "to us. Please allow 3 business days for your refill to be completed.          Additional Information About Your Visit        Care EveryWhere ID     This is your Care EveryWhere ID. This could be used by other organizations to access your [place] medical records  [id number]        Your Vitals Were     Pulse Temperature Respirations Height Last Period Pulse Oximetry    80 99  F (37.2  C) (Oral) 20 1.6 m (5' 3\") 09/29/2005 99%    Breastfeeding? BMI (Body Mass Index)                No 26.5 kg/m2           Blood Pressure from Last 3 Encounters:   05/21/18 158/90   03/30/18 130/78   11/22/17 120/80    Weight from Last 3 Encounters:   05/21/18 67.9 kg (149 lb 9.6 oz)   03/30/18 67 kg (147 lb 9.6 oz)   11/22/17 61.2 kg (135 lb)              We Performed the Following     Albumin Random Urine Quantitative with Creat Ratio     COMPREHENSIVE METABOLIC PANEL     HEMOGLOBIN A1C     HEMOGLOBIN [31669.000]     LIPID PANEL     ThinPrep Pap and HPV (mRNA E6/E7){HPV-REFLEX} ([facility])     VENIPUNC FNGR,HEEL,EAR [69113]          Where to get your medicines      These medications were sent to [facility] - [address], [facility] [address]     Phone:  [phone]     atorvastatin 20 MG tablet    valsartan-hydrochlorothiazide 160-12.5 MG per tablet          Primary Care Provider Office Phone # Fax #    Latisha Flanagan [phone]       [facility] [address] [address]        Equal Access to Services     [facility] : Hadii aad ku hadasho Soomaali, waaxda luqadaha, qaybta kaalmada jacob, ela albarran. So [facility] [phone].    ATENCIÓN: Si habla español, tiene a castaneda disposición servicios gratuitos de asistencia lingüística. Llame al [phone].    We comply with applicable federal civil rights laws and [place] laws. We do not discriminate on the basis of race, color, national origin, age, disability, " sex, sexual orientation, or gender identity.            Thank you!     Thank you for choosing [facility]  for your care. Our goal is always to provide you with excellent care. Hearing back from our patients is one way we can continue to improve our services. Please take a few minutes to complete the written survey that you may receive in the mail after your visit with us. Thank you!             Your Updated Medication List - Protect others around you: Learn how to safely use, store and throw away your medicines at www.disposemymeds.org.          This list is accurate as of 5/21/18  9:24 AM.  Always use your most recent med list.                   Brand Name Dispense Instructions for use Diagnosis    aspirin 81 MG tablet      Take 1 tablet (81 mg) by mouth daily    Type II or unspecified type diabetes mellitus without mention of complication, not stated as uncontrolled       atorvastatin 20 MG tablet    LIPITOR    90 tablet    Take 1 tablet (20 mg) by mouth daily    Mixed hyperlipidemia, Type 2 diabetes mellitus with complication, without long-term current use of insulin (H)       [employer] CONTOUR NEXT test strip   Generic drug:  blood glucose monitoring      TEST 1-2 TIMES QD        ibuprofen 600 MG tablet    ADVIL/MOTRIN     Take 400 mg by mouth 3 times daily as needed        linagliptin-metFORMIN ER 5-1000 MG per tablet    JENTADUETO XR    90 tablet    Take 1 tablet by mouth daily with food    Type 2 diabetes mellitus with complication, without long-term current use of insulin (H)       metFORMIN 500 MG 24 hr tablet    GLUCOPHAGE-XR     Take 1,000 mg by mouth daily (with dinner)        valsartan-hydrochlorothiazide 160-12.5 MG per tablet    DIOVAN HCT    90 tablet    Take 1 tablet by mouth daily    Essential hypertension, benign, Type 2 diabetes mellitus with complication, without long-term current use of insulin (H)

## 2018-05-21 NOTE — PROGRESS NOTES
Here for perventative GYn exam and to evalute her type 2 diabetes, cholesterol and hypertension/ see second note.    1.SUBJECTIVE:  Shayla Winter is an 62 year old  postmenopausal woman   who presents for annual gyn exam. Menopause at age 50. No   bleeding, spotting, or discharge noted.     Estrogen replacement therapy: never  ENZO exposure: no  History of abnormal Pap smear: No  Family history of uterine or ovarian cancer: No  Regular self breast exam: Yes  History of abnormal mammogram: No  Family history of breast cancer: No  History of abnormal lipids: Yes: on medication    Past Medical History:  No date: Arthritis  No date: Diabetes mellitus (H)  1998: Other and unspecified hyperlipidemia     Comment: Hyperlipidemia  : Unspecified essential hypertension     Comment: Hypertension, Essential      Family History    Hypertension Mother     Lipids Mother     DIABETES Mother     Comment: borderline    C.A.D. Mother     Hypertension Father     DIABETES Other     Comment: sister's daughter    Thyroid Disease No family hx of     Colon Cancer No family hx of        Past Surgical History:  2013: ARTHROPLASTY HIP ANTERIOR     Comment: Procedure: ARTHROPLASTY HIP ANTERIOR;  RIGHT               DIRECT ANTERIOR TOTAL HIP ARTHROPLASTY (DEPUY)^              (HANA TABLE, C-ARM);  Surgeon: Cricket Castellano MD;  Location:  OR  : C C-SEC ONLY,PREV C-SEC  : C TREAT ECTOPIC PREG,RMV TUBE/OVARY  2017: COLONOSCOPY N/A     Comment: Procedure: COLONOSCOPY;  Colonoscopy ;                Surgeon: Rachelle Vela MD;  Location:               GI    Current Outpatient Prescriptions:  aspirin 81 MG tablet   atorvastatin (LIPITOR) 20 MG tablet   GABRIELA CONTOUR NEXT test strip   ibuprofen (ADVIL,MOTRIN) 600 MG tablet   linagliptin-metFORMIN ER (JENTADUETO XR) 5-1000 MG per tablet   metFORMIN (GLUCOPHAGE-XR) 500 MG 24 hr tablet   valsartan-hydrochlorothiazide (DIOVAN HCT) 160-12.5 MG per  tablet   No current facility-administered medications for this visit.    -- Niacin -- Hives   --  hives,swollen  -- Adhesive Tape -- Rash       Smoking status: Never Smoker    Smokeless tobacco: Never Used    Alcohol use Yes  0.5 oz/week    1 Standard drinks or equivalent per week         Comment: once a week       Review Of Systems  Ears/Nose/Throat: hearing loss  Respiratory: No shortness of breath, dyspnea on exertion, cough, or hemoptysis  Cardiovascular: hypertension and elevated cholesterol  Gastrointestinal: negative and 7/2017 colonoscopy WNL/ diverticulosis  Genitourinary: negative  Endo: type 2 diabetes    OBJECTIVE:  LMP 09/29/2005  General appearance: healthy, alert, no distress, cooperative, smiling and over weight  Skin: Skin color, texture, turgor normal. No rashes or lesions. Benign nevus on scalp  Ears: negative  Nose/Sinuses: Nares normal. Septum midline. Mucosa normal. No drainage or sinus tenderness.  Oropharynx: Lips, mucosa, and tongue normal. Teeth and gums normal.  Neck: Neck supple. No adenopathy. Thyroid symmetric, normal size,, Carotids without bruits.  Lungs: negative, Percussion normal. Good diaphragmatic excursion. Lungs clear  Heart: negative, PMI normal. No lifts, heaves, or thrills. RRR. No murmurs, clicks gallops or rub  Breasts: Inspection negative. No nipple discharge or bleeding. No masses.  Abdomen: Abdomen soft, non-tender. BS normal. No masses, organomegaly  Pelvic: External genitalia and vagina normal. Bimanual and rectovaginal normal., positive findings:  vaginal mucosa atrophy  BMI : Body mass index is 26.5 kg/(m^2).    ASSESSMENT:(Z01.411) Encounter for gynecological examination with abnormal finding  (primary encounter diagnosis)  Plan: VENIPUNC FNGR,HEEL,EAR [74205], HEMOGLOBIN         [35416.000], ThinPrep Pap and HPV (mRNA         E6/E7) ([facility])        Total calcium intake of 1500 mgm/day, vitamin D 400-800IU/day and a regular weight bearing exercise program for  prevention of osteoporosis is recommended treatment at this time.      (E11.8) Type 2 diabetes mellitus with complication, without long-term current use of insulin (H)  Comment: back to Endocrinology    Plan: VENIPUNC FNGR,HEEL,EAR [49064], HEMOGLOBIN A1C,        Albumin Random Urine Quantitative with Creat         Ratio, LIPID PANEL, COMPREHENSIVE METABOLIC         PANEL, valsartan-hydrochlorothiazide (DIOVAN         HCT) 160-12.5 MG per tablet, atorvastatin         (LIPITOR) 20 MG tablet        Reviewed concepts of diabetes self-management stressing the primary   role of the patient in monitoring and maintaining control of   diabetes.      (E78.2) Mixed hyperlipidemia  Plan: VENIPUNC FNGR,HEEL,EAR [14130], LIPID PANEL,         atorvastatin (LIPITOR) 20 MG tablet        1)  Medication: continue current medication regimen unchanged  2)  Low fat, low cholesterol diet  3)  Regular aerobic exercise  4)  Recheck in 6 months, sooner should new symptoms or   problems arise.    Patient Education: Reviewed risks of elevated lipids and principles   of treatment.        (I10) Essential hypertension, benign  Plan: VENIPUNC FNGR,HEEL,EAR [64200], COMPREHENSIVE         METABOLIC PANEL, valsartan-hydrochlorothiazide         (DIOVAN HCT) 160-12.5 MG per tablet        1)  Medication: continue current medication regimen unchanged  2)  Dietary sodium restriction  3)  Regular aerobic exercise  4)  Recheck in 6 months, sooner should new symptoms or   problems arise.    Patient Education: Reviewed risks of hypertension and principles of   treatment.        (Z76.0) Encounter for medication refill  Plan: VENIPUNC FNGR,HEEL,EAR [[id number]], HEMOGLOBIN A1C,        HEMOGLOBIN [56683.000], Albumin Random Urine         Quantitative with Creat Ratio, LIPID PANEL,         COMPREHENSIVE METABOLIC PANEL                PE:  Reviewed health maintenance including diet, regular exercise,   estrogen replacement and periodic exams.      SUBJECTIVE:  Shayla BASS  Maggy is an 62 year old female who presents for evaluation and treatment   of Type 2 diabetes mellitus. Age at diagnosis 52. Family history   positive for diabetes in the patient s mother.   Previous treatment modalities employed include diet, oral agents, exercise and ASA.   Current treatment includes diet, oral agents, exercise and ASA.     Current monitoring regimen: home blood tests - once daily  Home blood sugar records: 120-140  Last HgbA1c: 6.7  Diabetic complications: nephropathy  Cardiovascular risk factors: family history, lipids, diabetes mellitus, hypertension, obesity and sedentary life style    Current Outpatient Prescriptions   Medication     aspirin 81 MG tablet     atorvastatin (LIPITOR) 20 MG tablet     linagliptin-metFORMIN ER (JENTADUETO XR) 5-1000 MG per tablet     metFORMIN (GLUCOPHAGE-XR) 500 MG 24 hr tablet     valsartan-hydrochlorothiazide (DIOVAN HCT) 160-12.5 MG per tablet     GABRIELA CONTOUR NEXT test strip     ibuprofen (ADVIL,MOTRIN) 600 MG tablet     No current facility-administered medications for this visit.      Allergies   Allergen Reactions     Niacin Hives     hives,swollen     Adhesive Tape Rash       Social History   Substance Use Topics     Smoking status: Never Smoker     Smokeless tobacco: Never Used     Alcohol use 0.5 oz/week     1 Standard drinks or equivalent per week      Comment: once a week       Review Of Systems  Skin: negative  Eyes: 12/2017 WNL  Ears/Nose/Throat: negative  Respiratory: No shortness of breath, dyspnea on exertion, cough, or hemoptysis  Cardiovascular: hypertension/ elevated cholesterol  Gastrointestinal: negative  Genitourinary: negative  Musculoskeletal: hip pain getting ready for replacment surgery  Neurologic: negative  Psychiatric: negative  Hematologic/Lymphatic/Immunologic: negative  Endocrine: diabetes    OBJECTIVE:  /90 (BP Location: Left arm, Patient Position: Chair, Cuff Size: Adult Large)  Pulse 80  Temp 99  F (37.2  C) (Oral)   "Ht 1.6 m (5' 3\")  Wt 67.9 kg (149 lb 9.6 oz)  LMP 09/29/2005  SpO2 99%  Breastfeeding? No  BMI 26.5 kg/m2  General appearance: healthy, alert, no distress, cooperative, smiling and over weight  Skin: Skin color, texture, turgor normal. No rashes or lesions.  Eyes: conjunctivae/corneas clear. PERRL, EOM's intact. Fundi benign  Ears: negative  Oropharynx: Lips, mucosa, and tongue normal. Teeth and gums normal.  Neck: Neck supple. No adenopathy. Thyroid symmetric, normal size,, Carotids without bruits.  Lungs: negative, Percussion normal. Good diaphragmatic excursion. Lungs clear  Heart: negative, PMI normal. No lifts, heaves, or thrills. RRR. No murmurs, clicks gallops or rub  Abdomen: Abdomen soft, non-tender. BS normal. No masses, organomegaly  Extremities: Extremities normal. No deformities, edema, or skin discoloration.  Peripheral pulses: radial=4/4, femoral=4/4, popliteal=4/4, dorsalis pedis=4/4,  Neuro: Gait normal. Reflexes normal and symmetric. Sensation grossly WNL.    "

## 2018-05-21 NOTE — LETTER
May 29, 2018      Shayla Winter  [address]  [place] [address]        Dear Ms.Velmacarolineaquilino,    We are writing to inform you of your test results.    Normal thin prep (pap smear)    See other labs for your upcoming Endocrinology visit.        If you have any questions or concerns, please call the clinic at the number listed above.       Sincerely,        Latisha Flanagan MD

## 2018-05-22 LAB
ALBUMIN SERPL-MCNC: 4.6 G/DL (ref 3.6–5.1)
ALBUMIN/GLOB SERPL: 1.7 (CALC) (ref 1–2.5)
ALP SERPL-CCNC: 60 U/L (ref 33–130)
ALT SERPL-CCNC: 15 U/L (ref 6–29)
AST SERPL-CCNC: 12 U/L (ref 10–35)
BILIRUB SERPL-MCNC: 0.6 MG/DL (ref 0.2–1.2)
BUN SERPL-MCNC: 14 MG/DL (ref 7–25)
BUN/CREATININE RATIO: ABNORMAL (CALC) (ref 6–22)
CALCIUM SERPL-MCNC: 9.1 MG/DL (ref 8.6–10.4)
CHLORIDE SERPLBLD-SCNC: 98 MMOL/L (ref 98–110)
CHOLEST SERPL-MCNC: 192 MG/DL
CHOLEST/HDLC SERPL: 2.9 (CALC)
CO2 SERPL-SCNC: 25 MMOL/L (ref 20–31)
CREAT SERPL-MCNC: 0.59 MG/DL (ref 0.5–0.99)
EGFR AFRICAN AMERICAN - QUEST: 115 ML/MIN/1.73M2
GFR SERPL CREATININE-BSD FRML MDRD: 99 ML/MIN/1.73M2
GLOBULIN, CALCULATED - QUEST: 2.7 G/DL (CALC) (ref 1.9–3.7)
GLUCOSE - QUEST: 132 MG/DL (ref 65–99)
HDLC SERPL-MCNC: 66 MG/DL
LDLC SERPL CALC-MCNC: 103 MG/DL (CALC)
NONHDLC SERPL-MCNC: 126 MG/DL (CALC)
POTASSIUM SERPL-SCNC: 3.7 MMOL/L (ref 3.5–5.3)
PROT SERPL-MCNC: 7.3 G/DL (ref 6.1–8.1)
SODIUM SERPL-SCNC: 136 MMOL/L (ref 135–146)
TRIGL SERPL-MCNC: 132 MG/DL

## 2018-05-24 LAB
CLINICAL HISTORY - QUEST: NORMAL
COMMENT - QUEST: NORMAL
CYTOTECHNOLOGIST - QUEST: NORMAL
DESCRIPTIVE DIAGNOSIS - QUEST: NORMAL
LAST PAP DX - QUEST: NORMAL
LMP - QUEST: NORMAL
PREV BX DX - QUEST: NORMAL
SOURCE: NORMAL
STATEMENT OF ADEQUACY - QUEST: NORMAL

## 2018-06-04 ENCOUNTER — TRANSFERRED RECORDS (OUTPATIENT)
Dept: FAMILY MEDICINE | Facility: CLINIC | Age: 63
End: 2018-06-04

## 2018-06-20 ENCOUNTER — OFFICE VISIT (OUTPATIENT)
Dept: FAMILY MEDICINE | Facility: CLINIC | Age: 63
End: 2018-06-20

## 2018-06-20 VITALS
HEART RATE: 83 BPM | RESPIRATION RATE: 16 BRPM | BODY MASS INDEX: 27.11 KG/M2 | WEIGHT: 153 LBS | TEMPERATURE: 99.5 F | SYSTOLIC BLOOD PRESSURE: 136 MMHG | OXYGEN SATURATION: 99 % | DIASTOLIC BLOOD PRESSURE: 88 MMHG | HEIGHT: 63 IN

## 2018-06-20 DIAGNOSIS — I10 ESSENTIAL HYPERTENSION, BENIGN: ICD-10-CM

## 2018-06-20 DIAGNOSIS — E11.8 TYPE 2 DIABETES MELLITUS WITH COMPLICATION, WITHOUT LONG-TERM CURRENT USE OF INSULIN (H): ICD-10-CM

## 2018-06-20 DIAGNOSIS — Z01.818 PRE-OPERATIVE EXAMINATION: Primary | ICD-10-CM

## 2018-06-20 DIAGNOSIS — M16.12 PRIMARY OSTEOARTHRITIS OF LEFT HIP: ICD-10-CM

## 2018-06-20 LAB
HBA1C MFR BLD: 6.7 % (ref 4–7)
HEMOGLOBIN: 13.6 G/DL (ref 11.7–15.7)

## 2018-06-20 PROCEDURE — 84132 ASSAY OF SERUM POTASSIUM: CPT | Mod: 90 | Performed by: FAMILY MEDICINE

## 2018-06-20 PROCEDURE — 99214 OFFICE O/P EST MOD 30 MIN: CPT | Performed by: FAMILY MEDICINE

## 2018-06-20 PROCEDURE — 83036 HEMOGLOBIN GLYCOSYLATED A1C: CPT | Performed by: FAMILY MEDICINE

## 2018-06-20 PROCEDURE — 36415 COLL VENOUS BLD VENIPUNCTURE: CPT | Performed by: FAMILY MEDICINE

## 2018-06-20 PROCEDURE — 93000 ELECTROCARDIOGRAM COMPLETE: CPT | Performed by: FAMILY MEDICINE

## 2018-06-20 PROCEDURE — 85018 HEMOGLOBIN: CPT | Performed by: FAMILY MEDICINE

## 2018-06-20 NOTE — MR AVS SNAPSHOT
"              After Visit Summary   6/20/2018    Shayla Winter    MRN: [id number]           Patient Information     Date Of Birth          1955        Visit Information        Provider Department      6/20/2018 11:30 AM Latisha Flanagan MD [facility]        Today's Diagnoses     Pre-operative examination    -  1    Primary osteoarthritis of left hip        Type 2 diabetes mellitus with complication, without long-term current use of insulin (H)        Essential hypertension, benign          Care Instructions    Take copy to surgery site          Follow-ups after your visit        Follow-up notes from your care team     Return if symptoms worsen or fail to improve.      Who to contact     If you have questions or need follow up information about today's clinic visit or your schedule please contact [facility] directly at [phone].  Normal or non-critical lab and imaging results will be communicated to you by MyChart, letter or phone within 4 business days after the clinic has received the results. If you do not hear from us within 7 days, please contact the clinic through MyChart or phone. If you have a critical or abnormal lab result, we will notify you by phone as soon as possible.  Submit refill requests through [employer] or call your pharmacy and they will forward the refill request to us. Please allow 3 business days for your refill to be completed.          Additional Information About Your Visit        Care EveryWhere ID     This is your Care EveryWhere ID. This could be used by other organizations to access your [facility] medical records  [id number]        Your Vitals Were     Pulse Temperature Respirations Height Last Period Pulse Oximetry    83 99.5  F (37.5  C) (Oral) 16 1.6 m (5' 3\") 09/29/2005 99%    Breastfeeding? BMI (Body Mass Index)                No 27.1 kg/m2           Blood Pressure from Last 3 Encounters:   06/20/18 136/88   05/21/18 158/90 "   03/30/18 130/78    Weight from Last 3 Encounters:   06/20/18 69.4 kg (153 lb)   05/21/18 67.9 kg (149 lb 9.6 oz)   03/30/18 67 kg (147 lb 9.6 oz)              We Performed the Following     CL AFF HEMOGLOBIN (BFP)     EKG 12-lead complete w/read - Clinics     Hemoglobin A1c (BFP)     POTASSIUM ([facility])     VENOUS COLLECTION        Primary Care Provider Office Phone # Fax #    Latisha Flanagan [phone] [address] [address]        Equal Access to Services     [facility]: Hadii geronimo jackson hadasho Soliza, waaxda luqadaha, qaybta kaalmada adeconstantin, ela correa . So [facility] [phone].    ATENCIÓN: Si habla español, tiene a castaneda disposición servicios gratuitos de asistencia lingüística. [facility] [phone].    We comply with applicable federal civil rights laws and [place] laws. We do not discriminate on the basis of race, color, national origin, age, disability, sex, sexual orientation, or gender identity.            Thank you!     Thank you for choosing [facility]  for your care. Our goal is always to provide you with excellent care. Hearing back from our patients is one way we can continue to improve our services. Please take a few minutes to complete the written survey that you may receive in the mail after your visit with us. Thank you!             Your Updated Medication List - Protect others around you: Learn how to safely use, store and throw away your medicines at www.disposemymeds.org.          This list is accurate as of 6/20/18 12:04 PM.  Always use your most recent med list.                   Brand Name Dispense Instructions for use Diagnosis    aspirin 81 MG tablet      Take 1 tablet (81 mg) by mouth daily    Type II or unspecified type diabetes mellitus without mention of complication, not stated as uncontrolled       atorvastatin 20 MG tablet    LIPITOR    90 tablet    Take 1 tablet (20 mg) by  mouth daily    Mixed hyperlipidemia, Type 2 diabetes mellitus with complication, without long-term current use of insulin (H)       GABRIELA CONTOUR NEXT test strip   Generic drug:  blood glucose monitoring      TEST 1-2 TIMES QD        ibuprofen 600 MG tablet    ADVIL/MOTRIN     Take 400 mg by mouth 3 times daily as needed        linagliptin-metFORMIN ER 5-1000 MG per tablet    JENTADUETO XR    90 tablet    Take 1 tablet by mouth daily with food    Type 2 diabetes mellitus with complication, without long-term current use of insulin (H)       metFORMIN 500 MG 24 hr tablet    GLUCOPHAGE-XR     Take 1,000 mg by mouth daily (with dinner)        valsartan-hydrochlorothiazide 160-12.5 MG per tablet    DIOVAN HCT    90 tablet    Take 1 tablet by mouth daily    Essential hypertension, benign, Type 2 diabetes mellitus with complication, without long-term current use of insulin (H)

## 2018-06-20 NOTE — PROGRESS NOTES
[facility]  [address]  [address]  [facility] [address]  [phone]  Dept: [phone]    PRE-OP EVALUATION:  Today's date: 2018    Shayla Winter (: 1955) presents for pre-operative evaluation assessment as requested by Dr. Cricket Benavidez.  She requires evaluation and anesthesia risk assessment prior to undergoing surgery/procedure for treatment of deterioration of the cartilage in left hip with degenerative issues.    Proposed Surgery/ Procedure: Total Hip Replacement (Left)  Date of Surgery/ Procedure: 2018  Time of Surgery/ Procedure: 7:30AM  Hospital/Surgical Facility: [facility]  Fax number for surgical facility: [phone]  Primary Physician: Latisha Flanagan  Type of Anesthesia Anticipated: General    Patient has a Health Care Directive or Living Will:  NO (I gave the patient the Health Care Directive Document to complete)    1. NO - Do you have a history of heart attack, stroke, stent, bypass or surgery on an artery in the head, neck, heart or legs?  2. NO - Do you ever have any pain or discomfort in your chest?  3. NO - Do you have a history of  Heart Failure?  4. NO - Are you troubled by shortness of breath when: walking on the level, up a slight hill or at night?  5. NO - Do you currently have a cold, bronchitis or other respiratory infection?  6. NO - Do you have a cough, shortness of breath or wheezing?  7. NO - Do you sometimes get pains in the calves of your legs when you walk?  8. NO - Do you or anyone in your family have previous history of blood clots?  9. NO - Do you or does anyone in your family have a serious bleeding problem such as prolonged bleeding following surgeries or cuts?  10. NO - Have you ever had problems with anemia or been told to take iron pills?  11. NO - Have you had any abnormal blood loss such as black, tarry or bloody stools, or abnormal vaginal bleeding?  12. YES - HAVE YOU EVER HAD  A BLOOD TRANSFUSION? 1982 ectopic pregnancy  13. NO - Have you or any of your relatives ever had problems with anesthesia?  14. NO - Do you have sleep apnea, excessive snoring or daytime drowsiness?  15. NO - Do you have any prosthetic heart valves?  16. YES - DO YOU HAVE PROSTHETIC JOINTS? Right hip replaced  17. NO - Is there any chance that you may be pregnant?      HPI:     HPI related to upcoming procedure: Left hip replacement      See problem list for active medical problems.  Problems all longstanding and stable, except as noted/documented.  See ROS for pertinent symptoms related to these conditions.                                                                                                                                                          .    MEDICAL HISTORY:     Patient Active Problem List    Diagnosis Date Noted     Type 2 diabetes mellitus with complication, without long-term current use of insulin (H) 03/25/2015     Priority: Medium     S/P hip replacement 08/28/2013     Priority: Medium     Overweight (BMI 25.0-29.9) 06/10/2013     Priority: Medium     ACP (advance care planning) 06/08/2011     Priority: Medium     Advance Care Planning 9/30/2015: ACP Review and Resources Provided:  Reviewed chart for advance care plan.  Shayla Winter has no plan or code status on file. Discussed available resources and provided with information. Confirmed code status reflects current choices pending further ACP discussions.  Confirmed/documented legally designated decision maker(s). Added by Christy Hebert               Symptomatic menopausal or female climacteric states 06/26/2002     Priority: Medium     Hyperlipidemia      Priority: Medium     Problem list name updated by automated process. Provider to review       Essential hypertension, benign      Priority: Medium     Health Care Home 12/03/2012     Priority: Low     State Tier Level:  Tier 1  Status:  n/a  Care Coordinator:  n/a   See Letters for  [facility] Care Plan            Past Medical History:   Diagnosis Date     Arthritis      Diabetes mellitus (H)      Other and unspecified hyperlipidemia 1998    Hyperlipidemia     Unspecified essential hypertension 1988    Hypertension, Essential     Past Surgical History:   Procedure Laterality Date     ARTHROPLASTY HIP ANTERIOR  8/28/2013    Procedure: ARTHROPLASTY HIP ANTERIOR;  RIGHT DIRECT ANTERIOR TOTAL HIP ARTHROPLASTY (DEPUY)^ (HANA TABLE, C-ARM);  Surgeon: Cricket Castellano MD;  Location: [facility] OR     C C-SEC ONLY,PREV C-SEC  1980/1983     C TREAT ECTOPIC PREG,RMV TUBE/OVARY  1982     COLONOSCOPY N/A 7/31/2017    Procedure: COLONOSCOPY;  Colonoscopy ;  Surgeon: Rachelle Vela MD;  Location:  GI     Current Outpatient Prescriptions   Medication Sig Dispense Refill     aspirin 81 MG tablet Take 1 tablet (81 mg) by mouth daily       atorvastatin (LIPITOR) 20 MG tablet Take 1 tablet (20 mg) by mouth daily 90 tablet 1     linagliptin-metFORMIN ER (JENTADUETO XR) 5-1000 MG per tablet Take 1 tablet by mouth daily with food 90 tablet 0     metFORMIN (GLUCOPHAGE-XR) 500 MG 24 hr tablet Take 1,000 mg by mouth daily (with dinner)       valsartan-hydrochlorothiazide (DIOVAN HCT) 160-12.5 MG per tablet Take 1 tablet by mouth daily 90 tablet 1     GABRIELA CONTOUR NEXT test strip TEST 1-2 TIMES QD  3     ibuprofen (ADVIL,MOTRIN) 600 MG tablet Take 400 mg by mouth 3 times daily as needed       OTC products: None, except as noted above    Allergies   Allergen Reactions     Niacin Hives     hives,swollen     Adhesive Tape Rash      Latex Allergy: NO    Social History   Substance Use Topics     Smoking status: Never Smoker     Smokeless tobacco: Never Used     Alcohol use 0.5 oz/week     1 Standard drinks or equivalent per week      Comment: once a week     History   Drug Use No       REVIEW OF SYSTEMS:   Constitutional, HEENT, cardiovascular, pulmonary, gi and gu systems are negative, except as otherwise noted.    EXAM:   LMP  09/29/2005    GENERAL APPEARANCE: healthy, alert and no distress     EYES: EOMI, PERRL     HENT: ear canals and TM's normal and nose and mouth without ulcers or lesions     NECK: no adenopathy, no asymmetry, masses, or scars and thyroid normal to palpation     RESP: lungs clear to auscultation - no rales, rhonchi or wheezes     CV: regular rates and rhythm, normal S1 S2, no S3 or S4 and no murmur, click or rub     ABDOMEN:  soft, nontender, no HSM or masses and bowel sounds normal     MS: extremities normal- no gross deformities noted, no evidence of inflammation in joints, FROM in all extremities.     SKIN: no suspicious lesions or rashes     NEURO: Normal strength and tone, sensory exam grossly normal, mentation intact and speech normal     PSYCH: mentation appears normal. and affect normal/bright     LYMPHATICS: No cervical adenopathy    DIAGNOSTICS:   EKG: Normal Sinus Rhythm, normal axis, normal intervals, no acute ST/T changes c/w ischemia, no LVH by voltage criteria  Serum Potassium: pending  Hemoglobin A1C: 6.7  HGB:13.6 gm/dl    Recent Labs   Lab Test  05/21/18   0912  05/21/18   0904  05/21/18   0843  03/30/18   1145  11/22/17   1009   05/31/17   0857   08/28/13   0840   HGB   --    --   14.3   --    --    --   14.1   < >  13.6   PLT   --    --    --    --    --    --   303   --   251   NA  136   --    --    --   140   < >   --    < >   --    POTASSIUM  3.7   --    --    --   4.0   < >   --    < >  4.0   CR  0.59   --    --    --   0.53   < >   --    < >  0.60   A1C   --   6.7   --   6.2   --    < >   --    < >   --     < > = values in this interval not displayed.        IMPRESSION:   Diagnosis/reason for consult: (Z01.818) Pre-operative examination  (primary encounter diagnosis)      (M16.12) Primary osteoarthritis of left hip    (E11.8) Type 2 diabetes mellitus with complication, without use of insulin    (I10) Essential hypertension, benign  Plan: VENOUS COLLECTION, POTASSIUM ([facility]), EKG          12-lead complete w/read - Clinics                The proposed surgical procedure is considered INTERMEDIATE risk.    REVISED CARDIAC RISK INDEX  The patient has the following serious cardiovascular risks for perioperative complications such as (MI, PE, VFib and 3  AV Block):  No serious cardiac risks  INTERPRETATION: 0 risks: Class I (very low risk - 0.4% complication rate)    The patient has the following additional risks for perioperative complications:  No identified additional risks      ICD-10-CM    1. Pre-operative examination Z01.818 Hemoglobin A1c (BFP)     VENOUS COLLECTION     POTASSIUM ([facility])     CL AFF HEMOGLOBIN (BFP)     EKG 12-lead complete w/read - Clinics       RECOMMENDATIONS:     --Consult hospital rounder / IM to assist post-op medical management    --Patient is to take all scheduled medications on the day of surgery EXCEPT for modifications listed below.    APPROVAL GIVEN to proceed with proposed procedure, without further diagnostic evaluation       Signed Electronically by: Latisha Flanagan MD    Copy of this evaluation report is provided to requesting physician.

## 2018-06-20 NOTE — LETTER
[facility]   A partner of [facility]              [facility]  [facility]  [address]  [place]  [address]        For Emergencies:  Call 911      For Clinic Appointments:   [phone]                     [facility]  [address]  [address]  [facility] [address] [phone]  Dept: [phone]    PRE-OP EVALUATION:  Today's date: 2018    Shayla Winter (: 1955) presents for pre-operative evaluation assessment as requested by Dr. Cricket Benavidez.  She requires evaluation and anesthesia risk assessment prior to undergoing surgery/procedure for treatment of deterioration of the cartilage in left hip with degenerative issues.    Proposed Surgery/ Procedure: Total Hip Replacement (Left)  Date of Surgery/ Procedure: 2018  Time of Surgery/ Procedure: 7:30AM  [facility]  Fax number for surgical facility: [phone]  Primary Physician: Latisha Flanagan  Type of Anesthesia Anticipated: General    Patient has a Health Care Directive or Living Will:  NO (I gave the patient the Health Care Directive Document to complete)    1. NO - Do you have a history of heart attack, stroke, stent, bypass or surgery on an artery in the head, neck, heart or legs?  2. NO - Do you ever have any pain or discomfort in your chest?  3. NO - Do you have a history of  Heart Failure?  4. NO - Are you troubled by shortness of breath when: walking on the level, up a slight hill or at night?  5. NO - Do you currently have a cold, bronchitis or other respiratory infection?  6. NO - Do you have a cough, shortness of breath or wheezing?  7. NO - Do you sometimes get pains in the calves of your legs when you walk?  8. NO - Do you or anyone in your family have previous history of blood clots?  9. NO - Do you or does  anyone in your family have a serious bleeding problem such as prolonged bleeding following surgeries or cuts?  10. NO - Have you ever had problems with anemia or been told to take iron pills?  11. NO - Have you had any abnormal blood loss such as black, tarry or bloody stools, or abnormal vaginal bleeding?  12. YES - HAVE YOU EVER HAD A BLOOD TRANSFUSION? 1982 ectopic pregnancy  13. NO - Have you or any of your relatives ever had problems with anesthesia?  14. NO - Do you have sleep apnea, excessive snoring or daytime drowsiness?  15. NO - Do you have any prosthetic heart valves?  16. YES - DO YOU HAVE PROSTHETIC JOINTS? Right hip replaced  17. NO - Is there any chance that you may be pregnant?      HPI:     HPI related to upcoming procedure: Left hip replacement      See problem list for active medical problems.  Problems all longstanding and stable, except as noted/documented.  See ROS for pertinent symptoms related to these conditions.                                                                                                                                                          .    MEDICAL HISTORY:     Patient Active Problem List    Diagnosis Date Noted     Type 2 diabetes mellitus with complication, without long-term current use of insulin (H) 03/25/2015     Priority: Medium     S/P hip replacement 08/28/2013     Priority: Medium     Overweight (BMI 25.0-29.9) 06/10/2013     Priority: Medium     ACP (advance care planning) 06/08/2011     Priority: Medium     Advance Care Planning 9/30/2015: ACP Review and Resources Provided:  Reviewed chart for advance care plan.  Shayla Winter has no plan or code status on file. Discussed available resources and provided with information. Confirmed code status reflects current choices pending further ACP discussions.  Confirmed/documented legally designated decision maker(s). Added by Christy Hebert               Symptomatic menopausal or female climacteric states  06/26/2002     Priority: Medium     Hyperlipidemia      Priority: Medium     Problem list name updated by automated process. Provider to review       Essential hypertension, benign      Priority: Medium     Health Care Home 12/03/2012     Priority: Low     State Tier Level:  Tier 1  Status:  n/a  Care Coordinator:  n/a   See Letters for [facility] Care Plan            Past Medical History:   Diagnosis Date     Arthritis      Diabetes mellitus (H)      Other and unspecified hyperlipidemia 1998    Hyperlipidemia     Unspecified essential hypertension 1988    Hypertension, Essential     Past Surgical History:   Procedure Laterality Date     ARTHROPLASTY HIP ANTERIOR  8/28/2013    Procedure: ARTHROPLASTY HIP ANTERIOR;  RIGHT DIRECT ANTERIOR TOTAL HIP ARTHROPLASTY (DEPUY)^ (HANA TABLE, C-ARM);  Surgeon: Cricket Castellano MD;  Location: [facility] OR     C C-SEC ONLY,PREV C-SEC  1980/1983     C TREAT ECTOPIC PREG,RMV TUBE/OVARY  1982     COLONOSCOPY N/A 7/31/2017    Procedure: COLONOSCOPY;  Colonoscopy ;  Surgeon: Rachelle Vela MD;  Location:  GI     Current Outpatient Prescriptions   Medication Sig Dispense Refill     aspirin 81 MG tablet Take 1 tablet (81 mg) by mouth daily       atorvastatin (LIPITOR) 20 MG tablet Take 1 tablet (20 mg) by mouth daily 90 tablet 1     linagliptin-metFORMIN ER (JENTADUETO XR) 5-1000 MG per tablet Take 1 tablet by mouth daily with food 90 tablet 0     metFORMIN (GLUCOPHAGE-XR) 500 MG 24 hr tablet Take 1,000 mg by mouth daily (with dinner)       valsartan-hydrochlorothiazide (DIOVAN HCT) 160-12.5 MG per tablet Take 1 tablet by mouth daily 90 tablet 1     GABRIELA CONTOUR NEXT test strip TEST 1-2 TIMES QD  3     ibuprofen (ADVIL,MOTRIN) 600 MG tablet Take 400 mg by mouth 3 times daily as needed       OTC products: None, except as noted above    Allergies   Allergen Reactions     Niacin Hives     hives,swollen     Adhesive Tape Rash      Latex Allergy: NO    Social History   Substance Use Topics      Smoking status: Never Smoker     Smokeless tobacco: Never Used     Alcohol use 0.5 oz/week     1 Standard drinks or equivalent per week      Comment: once a week     History   Drug Use No       REVIEW OF SYSTEMS:   Constitutional, HEENT, cardiovascular, pulmonary, gi and gu systems are negative, except as otherwise noted.    EXAM:   [facility] 09/29/2005    GENERAL APPEARANCE: healthy, alert and no distress     EYES: EOMI, PERRL     HENT: ear canals and TM's normal and nose and mouth without ulcers or lesions     NECK: no adenopathy, no asymmetry, masses, or scars and thyroid normal to palpation     RESP: lungs clear to auscultation - no rales, rhonchi or wheezes     CV: regular rates and rhythm, normal S1 S2, no S3 or S4 and no murmur, click or rub     ABDOMEN:  soft, nontender, no HSM or masses and bowel sounds normal     MS: extremities normal- no gross deformities noted, no evidence of inflammation in joints, FROM in all extremities.     SKIN: no suspicious lesions or rashes     NEURO: Normal strength and tone, sensory exam grossly normal, mentation intact and speech normal     PSYCH: mentation appears normal. and affect normal/bright     LYMPHATICS: No cervical adenopathy    DIAGNOSTICS:   EKG: Normal Sinus Rhythm, normal axis, normal intervals, no acute ST/T changes c/w ischemia, no LVH by voltage criteria  Serum Potassium: pending  Hemoglobin A1C: 6.7  HGB:13.6 gm/dl    Recent Labs   Lab Test  05/21/18   0912  05/21/18   0904  05/21/18   0843  03/30/18   1145  11/22/17   1009   05/31/17   0857   08/28/13   0840   HGB   --    --   14.3   --    --    --   14.1   < >  13.6   PLT   --    --    --    --    --    --   303   --   251   NA  136   --    --    --   140   < >   --    < >   --    POTASSIUM  3.7   --    --    --   4.0   < >   --    < >  4.0   CR  0.59   --    --    --   0.53   < >   --    < >  0.60   A1C   --   6.7   --   6.2   --    < >   --    < >   --     < > = values in this interval not displayed.         IMPRESSION:   Diagnosis/reason for consult: (Z01.818) Pre-operative examination  (primary encounter diagnosis)      (M16.12) Primary osteoarthritis of left hip    (E11.8) Type 2 diabetes mellitus with complication, without use of insulin    (I10) Essential hypertension, benign  Plan: VENOUS COLLECTION, POTASSIUM (QUEST), EKG         12-lead complete w/read - Clinics                The proposed surgical procedure is considered INTERMEDIATE risk.    REVISED CARDIAC RISK INDEX  The patient has the following serious cardiovascular risks for perioperative complications such as (MI, PE, VFib and 3  AV Block):  No serious cardiac risks  INTERPRETATION: 0 risks: Class I (very low risk - 0.4% complication rate)    The patient has the following additional risks for perioperative complications:  No identified additional risks      ICD-10-CM    1. Pre-operative examination Z01.818 Hemoglobin A1c (BFP)     VENOUS COLLECTION     POTASSIUM (QUEST)     CL AFF HEMOGLOBIN (BFP)     EKG 12-lead complete w/read - Clinics       RECOMMENDATIONS:     --Consult hospital rounder / IM to assist post-op medical management    --Patient is to take all scheduled medications on the day of surgery EXCEPT for modifications listed below.    APPROVAL GIVEN to proceed with proposed procedure, without further diagnostic evaluation       Signed Electronically by: Latisha Flanagan MD    Copy of this evaluation report is provided to requesting physician.

## 2018-06-21 LAB — POTASSIUM SERPL-SCNC: 4.2 MMOL/L (ref 3.5–5.3)

## 2018-07-06 ENCOUNTER — TRANSFERRED RECORDS (OUTPATIENT)
Dept: FAMILY MEDICINE | Facility: CLINIC | Age: 63
End: 2018-07-06

## 2018-08-01 ENCOUNTER — TRANSFERRED RECORDS (OUTPATIENT)
Dept: FAMILY MEDICINE | Facility: CLINIC | Age: 63
End: 2018-08-01

## 2018-09-12 ENCOUNTER — TRANSFERRED RECORDS (OUTPATIENT)
Dept: FAMILY MEDICINE | Facility: CLINIC | Age: 63
End: 2018-09-12

## 2018-09-20 ENCOUNTER — TRANSFERRED RECORDS (OUTPATIENT)
Dept: FAMILY MEDICINE | Facility: CLINIC | Age: 63
End: 2018-09-20

## 2018-09-20 LAB — HBA1C MFR BLD: 7 % (ref 4–6)

## 2018-09-21 DIAGNOSIS — E11.8 TYPE 2 DIABETES MELLITUS WITH COMPLICATION, WITHOUT LONG-TERM CURRENT USE OF INSULIN (H): ICD-10-CM

## 2018-09-21 DIAGNOSIS — I10 ESSENTIAL HYPERTENSION, BENIGN: ICD-10-CM

## 2018-09-21 DIAGNOSIS — E78.2 MIXED HYPERLIPIDEMIA: ICD-10-CM

## 2018-09-21 RX ORDER — ATORVASTATIN CALCIUM 20 MG/1
TABLET, FILM COATED ORAL
Qty: 90 TABLET | OUTPATIENT
Start: 2018-09-21

## 2018-09-21 RX ORDER — VALSARTAN AND HYDROCHLOROTHIAZIDE 160; 12.5 MG/1; MG/1
TABLET, FILM COATED ORAL
Qty: 90 TABLET | OUTPATIENT
Start: 2018-09-21

## 2018-09-21 NOTE — TELEPHONE ENCOUNTER
Refused Prescriptions:                       Disp   Refills    valsartan-hydrochlorothiazide (DIOVAN-HCT)*90 tab*         Sig: TAKE 1 TABLET BY MOUTH  DAILY  Refused By: MIRIAM MANUEL  Reason for Refusal: Patient needs appointment    atorvastatin (LIPITOR) 20 MG tablet [Pharm*90 tab*         Sig: TAKE 1 TABLET BY MOUTH  DAILY  Refused By: MIRIAM MANUEL  Reason for Refusal: Patient needs appointment    This was mail order denied  Pt last refill was 5-  Pt due for a FASTING ov in November  [place]  [phone] (home)

## 2018-11-16 ENCOUNTER — OFFICE VISIT (OUTPATIENT)
Dept: FAMILY MEDICINE | Facility: CLINIC | Age: 63
End: 2018-11-16

## 2018-11-16 ENCOUNTER — TRANSFERRED RECORDS (OUTPATIENT)
Dept: FAMILY MEDICINE | Facility: CLINIC | Age: 63
End: 2018-11-16

## 2018-11-16 VITALS
RESPIRATION RATE: 16 BRPM | SYSTOLIC BLOOD PRESSURE: 128 MMHG | HEIGHT: 63 IN | DIASTOLIC BLOOD PRESSURE: 82 MMHG | HEART RATE: 78 BPM | WEIGHT: 161.6 LBS | BODY MASS INDEX: 28.63 KG/M2 | TEMPERATURE: 98.6 F | OXYGEN SATURATION: 99 %

## 2018-11-16 DIAGNOSIS — E11.8 TYPE 2 DIABETES MELLITUS WITH COMPLICATION, WITHOUT LONG-TERM CURRENT USE OF INSULIN (H): ICD-10-CM

## 2018-11-16 DIAGNOSIS — E78.2 MIXED HYPERLIPIDEMIA: Primary | ICD-10-CM

## 2018-11-16 DIAGNOSIS — I10 ESSENTIAL HYPERTENSION, BENIGN: ICD-10-CM

## 2018-11-16 DIAGNOSIS — Z76.0 ENCOUNTER FOR MEDICATION REFILL: ICD-10-CM

## 2018-11-16 PROCEDURE — 99214 OFFICE O/P EST MOD 30 MIN: CPT | Performed by: FAMILY MEDICINE

## 2018-11-16 PROCEDURE — 36415 COLL VENOUS BLD VENIPUNCTURE: CPT | Performed by: FAMILY MEDICINE

## 2018-11-16 PROCEDURE — 80061 LIPID PANEL: CPT | Mod: 90 | Performed by: FAMILY MEDICINE

## 2018-11-16 PROCEDURE — 80053 COMPREHEN METABOLIC PANEL: CPT | Mod: 90 | Performed by: FAMILY MEDICINE

## 2018-11-16 RX ORDER — VALSARTAN AND HYDROCHLOROTHIAZIDE 160; 12.5 MG/1; MG/1
1 TABLET, FILM COATED ORAL DAILY
Qty: 90 TABLET | Refills: 1 | Status: SHIPPED | OUTPATIENT
Start: 2018-11-16 | End: 2019-05-22

## 2018-11-16 RX ORDER — ATORVASTATIN CALCIUM 20 MG/1
20 TABLET, FILM COATED ORAL DAILY
Qty: 90 TABLET | Refills: 1 | Status: SHIPPED | OUTPATIENT
Start: 2018-11-16 | End: 2019-05-22

## 2018-11-16 NOTE — NURSING NOTE
Shayla is here today for a med recheck.    Pre-visit Screening:  Immunizations:  up to date  Colonoscopy:  is up to date  Mammogram: is up to date  Asthma Action Test/Plan:  NA  PHQ9:  NA  GAD7:  NA  Questioned patient about current smoking habits Pt. has never smoked.  Ok to leave detailed message on voice mail for today's visit only Yes, phone # [phone] or [phone].

## 2018-11-16 NOTE — PATIENT INSTRUCTIONS
1)  Medication: continue current medication regimen unchanged  2)  Low fat, low cholesterol diet and low salt  3)  Regular aerobic exercise and weight loss  4)  Recheck in 6 months fasting, sooner should new symptoms or   problems arise.    Patient Education: Reviewed risks of elevated lipids and principles   of treatment.

## 2018-11-16 NOTE — MR AVS SNAPSHOT
After Visit Summary   11/16/2018    Shayla Winter    MRN: [id number]           Patient Information     Date Of Birth          1955        Visit Information        Provider Department      11/16/2018 8:50 AM Latisha Flanagan MD [facility]        Today's Diagnoses     Mixed hyperlipidemia    -  1    Essential hypertension, benign        Type 2 diabetes mellitus with complication, without long-term current use of insulin (H)        Encounter for medication refill          Care Instructions    1)  Medication: continue current medication regimen unchanged  2)  Low fat, low cholesterol diet and low salt  3)  Regular aerobic exercise and weight loss  4)  Recheck in 6 months fasting, sooner should new symptoms or   problems arise.    Patient Education: Reviewed risks of elevated lipids and principles   of treatment.              Follow-ups after your visit        Follow-up notes from your care team     Return in about 6 months (around 5/16/2019), or if symptoms worsen or fail to improve.      Who to contact     If you have questions or need follow up information about today's clinic visit or your schedule please contact [facility] directly at [phone].  Normal or non-critical lab and imaging results will be communicated to you by MyChart, letter or phone within 4 business days after the clinic has received the results. If you do not hear from us within 7 days, please contact the clinic through MyChart or phone. If you have a critical or abnormal lab result, we will notify you by phone as soon as possible.  Submit refill requests through [employer] or call your pharmacy and they will forward the refill request to us. Please allow 3 business days for your refill to be completed.          Additional Information About Your Visit        Care EveryWhere ID     This is your Care EveryWhere ID. This could be used by other organizations to access your [facility]  "medical records  [id number]        Your Vitals Were     Pulse Temperature Respirations Height Last Period Pulse Oximetry    78 98.6  F (37  C) (Oral) 16 1.588 m (5' 2.5\") 09/29/2005 99%    BMI (Body Mass Index)                   29.09 kg/m2            Blood Pressure from Last 3 Encounters:   11/16/18 128/82   06/20/18 136/88   05/21/18 158/90    Weight from Last 3 Encounters:   11/16/18 73.3 kg (161 lb 9.6 oz)   06/20/18 69.4 kg (153 lb)   05/21/18 67.9 kg (149 lb 9.6 oz)              We Performed the Following     COMPREHENSIVE METABOLIC PANEL (QUEST) XCMP     Lipid Profile (QUEST)     VENOUS COLLECTION          Where to get your medicines      These medications were sent to [facility] - [address], [facility] [address]     Phone:  [phone]     atorvastatin 20 MG tablet    valsartan-hydrochlorothiazide 160-12.5 MG per tablet          Primary Care Provider Office Phone # Fax #    Latisha Flanagan [phone]       [facility] [address] [address]        Equal Access to Services     JAYY PEMBERTON AH: Hadii geronimo jackson hadasho Soomaali, waaxda luqadaha, qaybta kaalmada adeegyada, waxay akira albarran. So [facility] [phone].    ATENCIÓN: Si habla español, tiene a castaneda disposición servicios gratuitos de asistencia lingüística. ame al [phone].    We comply with applicable federal civil rights laws and [place] laws. We do not discriminate on the basis of race, color, national origin, age, disability, sex, sexual orientation, or gender identity.            Thank you!     Thank you for choosing [facility]  for your care. Our goal is always to provide you with excellent care. Hearing back from our patients is one way we can continue to improve our services. Please take a few minutes to complete the written survey that you may receive in the mail after your visit with us. " Thank you!             Your Updated Medication List - Protect others around you: Learn how to safely use, store and throw away your medicines at www.disposemymeds.org.          This list is accurate as of 11/16/18  9:56 AM.  Always use your most recent med list.                   Brand Name Dispense Instructions for use Diagnosis    aspirin 81 MG tablet      Take 1 tablet (81 mg) by mouth daily    Type II or unspecified type diabetes mellitus without mention of complication, not stated as uncontrolled       atorvastatin 20 MG tablet    LIPITOR    90 tablet    Take 1 tablet (20 mg) by mouth daily    Mixed hyperlipidemia, Type 2 diabetes mellitus with complication, without long-term current use of insulin (H)       Ingenic CONTOUR NEXT test strip   Generic drug:  blood glucose monitoring      TEST 1-2 TIMES QD        ibuprofen 600 MG tablet    ADVIL/MOTRIN     Take 400 mg by mouth 3 times daily as needed        linagliptin-metFORMIN ER 5-1000 MG per tablet    JENTADUETO XR    90 tablet    Take 1 tablet by mouth daily with food    Type 2 diabetes mellitus with complication, without long-term current use of insulin (H)       metFORMIN 500 MG 24 hr tablet    GLUCOPHAGE-XR     Take 1,000 mg by mouth daily (with dinner)        valsartan-hydrochlorothiazide 160-12.5 MG per tablet    DIOVAN HCT    90 tablet    Take 1 tablet by mouth daily    Essential hypertension, benign, Type 2 diabetes mellitus with complication, without long-term current use of insulin (H)

## 2018-11-16 NOTE — PROGRESS NOTES
"SUBJECTIVE:  Shayla Winter is an 63 year old female who presents for evaluation of   hyperlipidemia. She is getting her diabetes care from DR Reece, Endocrinology  WE will get her labs.    She has been diagnosed in the past as having   combined hyperlipidemia. She indicates that she is feeling well   and denies any symptoms of cardiovascular disease. Specifically   denies chest pain, palpitations, dyspnea, orthopnea, PND,   claudication or peripheral edema. Treatment modalities employed to   this point include diet, regular aerobic exercise and Lipitor. Current medication   regimen is as listed below. Patient denies any side effects of   medication.    Family history: positive for hypertension, cardiovascular disease and elevated lipids  Age at diagnosis of hyperlipidemia: 35/ hypertension age 41  Cardiovascular risk factors: family history, lipids, diabetes mellitus, hypertension, obesity and stress    Current Outpatient Prescriptions   Medication     aspirin 81 MG tablet     atorvastatin (LIPITOR) 20 MG tablet     GABRIELA CONTOUR NEXT test strip     ibuprofen (ADVIL,MOTRIN) 600 MG tablet     linagliptin-metFORMIN ER (JENTADUETO XR) 5-1000 MG per tablet     metFORMIN (GLUCOPHAGE-XR) 500 MG 24 hr tablet     valsartan-hydrochlorothiazide (DIOVAN HCT) 160-12.5 MG per tablet     No current facility-administered medications for this visit.      Allergies   Allergen Reactions     Niacin Hives     hives,swollen     Adhesive Tape Rash       Social History   Substance Use Topics     Smoking status: Never Smoker     Smokeless tobacco: Never Used     Alcohol use 0.5 oz/week     1 Standard drinks or equivalent per week      Comment: once a week       OBJECTIVE:  /82 (BP Location: Right arm, Patient Position: Sitting, Cuff Size: Adult Regular)  Pulse 78  Temp 98.6  F (37  C) (Oral)  Resp 16  Ht 1.588 m (5' 2.5\")  Wt 73.3 kg (161 lb 9.6 oz)  LMP 09/29/2005  SpO2 99%  BMI 29.09 kg/m2  Repeat BP R arm seated = " 128/82  with large size cuff.  Skin: negative  Fundi: deferred  Lungs: negative, Percussion normal. Good diaphragmatic excursion. Lungs clear  Heart: negative, PMI normal. No lifts, heaves, or thrills. RRR. No murmurs, clicks gallops or rub  Peripheral pulses: radial=4/4, femoral=4/4, popliteal=4/4, dorsalis pedis=4/4,  Abd; The abdomen is soft without tenderness, guarding, mass or organomegaly. Bowel sounds are normal. No CVA tenderness or inguinal adenopathy noted.  BMI : Body mass index is 29.09 kg/(m^2).    ASSESSMENT:  (E78.2) Mixed hyperlipidemia  (primary encounter diagnosis)  Plan: Lipid Profile (QUEST), VENOUS COLLECTION,         atorvastatin (LIPITOR) 20 MG tablet        1)  Medication: continue current medication regimen unchanged  2)  Low fat, low cholesterol diet  3)  Regular aerobic exercise  4)  Recheck in 6 months fasting, sooner should new symptoms or   problems arise.    Patient Education: Reviewed risks of elevated lipids and principles   of treatment.        (I10) Essential hypertension, benign  Plan: COMPREHENSIVE METABOLIC PANEL (QUEST) XCMP,         VENOUS COLLECTION,         valsartan-hydrochlorothiazide (DIOVAN HCT)         160-12.5 MG per tablet        1)  Medication: continue current medication regimen unchanged  2)  Dietary sodium restriction  3)  Regular aerobic exercise  4)  Recheck in 6 months, sooner should new symptoms or   problems arise.    Patient Education: Reviewed risks of hypertension and principles of   treatment.        (E11.8) Type 2 diabetes mellitus with complication, without long-term current use of insulin (H)  Plan: Lipid Profile (QUEST), COMPREHENSIVE METABOLIC         PANEL (QUEST) XCMP, VENOUS COLLECTION,         valsartan-hydrochlorothiazide (DIOVAN HCT)         160-12.5 MG per tablet, atorvastatin (LIPITOR)         20 MG tablet        I have reviewed the patient's medical history in detail and updated the computerized patient record.      (Z76.0) Encounter for  medication refill  Plan: Lipid Profile (QUEST), COMPREHENSIVE METABOLIC         PANEL (QUEST) XCMP, VENOUS COLLECTION

## 2018-11-16 NOTE — LETTER
November 23, 2018      Shayla Winter  [address]  [place] [address]        Dear ,    We are writing to inform you of your test results.    Your test results fall within the expected range(s) or remain unchanged from previous results.  Please continue with current treatment plan.    Resulted Orders   Lipid Profile (QUEST)   Result Value Ref Range    Cholesterol 181 <200 mg/dL    HDL Cholesterol 62 >50 mg/dL    Triglycerides 162 (H) <150 mg/dL    LDL Cholesterol Calculated 92 mg/dL (calc)      Comment:      Reference range: <100     Desirable range <100 mg/dL for primary prevention;    <70 mg/dL for patients with CHD or diabetic patients   with > or = 2 CHD risk factors.     LDL-C is now calculated using the Yobani   calculation, which is a validated novel method providing   better accuracy than the Friedewald equation in the   estimation of LDL-C.   Vincent DURÁN et al. ROSSANA. 2013;310(19): 6656-0437   (http://education.Maker's Row.TalentBin/faq/PCJ700)      Cholesterol/HDL Ratio 2.9 <5.0 (calc)    Non HDL Cholesterol 119 <130 mg/dL (calc)      Comment:      For patients with diabetes plus 1 major ASCVD risk   factor, treating to a non-HDL-C goal of <100 mg/dL   (LDL-C of <70 mg/dL) is considered a therapeutic   option.     COMPREHENSIVE METABOLIC PANEL (QUEST) XCMP   Result Value Ref Range    Glucose 165 (H) 65 - 99 mg/dL      Comment:                    Fasting reference interval     For someone without known diabetes, a glucose  value >125 mg/dL indicates that they may have  diabetes and this should be confirmed with a  follow-up test.         Urea Nitrogen 11 7 - 25 mg/dL    Creatinine 0.54 0.50 - 0.99 mg/dL      Comment:      For patients >49 years of age, the reference limit  for Creatinine is approximately 13% higher for people  identified as -American.         GFR Estimate 101 > OR = 60 mL/min/1.73m2    EGFR African American 117 > OR = 60 mL/min/1.73m2    BUN/Creatinine  Ratio NOT APPLICABLE 6 - 22 (calc)    Sodium 138 135 - 146 mmol/L    Potassium 4.5 3.5 - 5.3 mmol/L    Chloride 101 98 - 110 mmol/L    Carbon Dioxide 25 20 - 32 mmol/L    Calcium 9.7 8.6 - 10.4 mg/dL    Protein Total 7.1 6.1 - 8.1 g/dL    Albumin 4.4 3.6 - 5.1 g/dL    Globulin Calculated 2.7 1.9 - 3.7 g/dL (calc)    A/G Ratio 1.6 1.0 - 2.5 (calc)    Bilirubin Total 0.5 0.2 - 1.2 mg/dL    Alkaline Phosphatase 81 33 - 130 U/L    AST 14 10 - 35 U/L    ALT 19 6 - 29 U/L       If you have any questions or concerns, please call the clinic at the number listed above.       Sincerely,        Latisha Flanagan MD

## 2018-11-17 LAB
ALBUMIN SERPL-MCNC: 4.4 G/DL (ref 3.6–5.1)
ALBUMIN/GLOB SERPL: 1.6 (CALC) (ref 1–2.5)
ALP SERPL-CCNC: 81 U/L (ref 33–130)
ALT SERPL-CCNC: 19 U/L (ref 6–29)
AST SERPL-CCNC: 14 U/L (ref 10–35)
BILIRUB SERPL-MCNC: 0.5 MG/DL (ref 0.2–1.2)
BUN SERPL-MCNC: 11 MG/DL (ref 7–25)
BUN/CREATININE RATIO: ABNORMAL (CALC) (ref 6–22)
CALCIUM SERPL-MCNC: 9.7 MG/DL (ref 8.6–10.4)
CHLORIDE SERPLBLD-SCNC: 101 MMOL/L (ref 98–110)
CHOLEST SERPL-MCNC: 181 MG/DL
CHOLEST/HDLC SERPL: 2.9 (CALC)
CO2 SERPL-SCNC: 25 MMOL/L (ref 20–32)
CREAT SERPL-MCNC: 0.54 MG/DL (ref 0.5–0.99)
EGFR AFRICAN AMERICAN - QUEST: 117 ML/MIN/1.73M2
GFR SERPL CREATININE-BSD FRML MDRD: 101 ML/MIN/1.73M2
GLOBULIN, CALCULATED - QUEST: 2.7 G/DL (CALC) (ref 1.9–3.7)
GLUCOSE - QUEST: 165 MG/DL (ref 65–99)
HDLC SERPL-MCNC: 62 MG/DL
LDLC SERPL CALC-MCNC: 92 MG/DL (CALC)
NONHDLC SERPL-MCNC: 119 MG/DL (CALC)
POTASSIUM SERPL-SCNC: 4.5 MMOL/L (ref 3.5–5.3)
PROT SERPL-MCNC: 7.1 G/DL (ref 6.1–8.1)
SODIUM SERPL-SCNC: 138 MMOL/L (ref 135–146)
TRIGL SERPL-MCNC: 162 MG/DL

## 2018-12-14 ENCOUNTER — TRANSFERRED RECORDS (OUTPATIENT)
Dept: FAMILY MEDICINE | Facility: CLINIC | Age: 63
End: 2018-12-14

## 2019-02-11 ENCOUNTER — TRANSFERRED RECORDS (OUTPATIENT)
Dept: FAMILY MEDICINE | Facility: CLINIC | Age: 64
End: 2019-02-11

## 2019-04-22 ENCOUNTER — HOSPITAL ENCOUNTER (EMERGENCY)
Facility: CLINIC | Age: 64
Discharge: HOME | End: 2019-04-22
Attending: EMERGENCY MEDICINE | Admitting: EMERGENCY MEDICINE
Payer: COMMERCIAL

## 2019-04-22 VITALS
RESPIRATION RATE: 17 BRPM | TEMPERATURE: 98.9 F | HEART RATE: 93 BPM | SYSTOLIC BLOOD PRESSURE: 157 MMHG | DIASTOLIC BLOOD PRESSURE: 90 MMHG | OXYGEN SATURATION: 94 % | WEIGHT: 160 LBS | HEIGHT: 63 IN | BODY MASS INDEX: 28.35 KG/M2

## 2019-04-22 DIAGNOSIS — I10 ASYMPTOMATIC HYPERTENSION: ICD-10-CM

## 2019-04-22 LAB
ANION GAP SERPL CALCULATED.3IONS-SCNC: 10 MMOL/L (ref 3–14)
BUN SERPL-MCNC: 21 MG/DL (ref 7–30)
CALCIUM SERPL-MCNC: 9.8 MG/DL (ref 8.5–10.1)
CHLORIDE SERPL-SCNC: 99 MMOL/L (ref 94–109)
CO2 SERPL-SCNC: 26 MMOL/L (ref 20–32)
CREAT SERPL-MCNC: 0.52 MG/DL (ref 0.52–1.04)
GFR SERPL CREATININE-BSD FRML MDRD: >90 ML/MIN/{1.73_M2}
GLUCOSE SERPL-MCNC: 147 MG/DL (ref 70–99)
POTASSIUM SERPL-SCNC: 3.3 MMOL/L (ref 3.4–5.3)
SODIUM SERPL-SCNC: 135 MMOL/L (ref 133–144)
TROPONIN I SERPL-MCNC: <0.015 UG/L (ref 0–0.04)

## 2019-04-22 PROCEDURE — 99284 EMERGENCY DEPT VISIT MOD MDM: CPT

## 2019-04-22 PROCEDURE — 93005 ELECTROCARDIOGRAM TRACING: CPT

## 2019-04-22 PROCEDURE — 80048 BASIC METABOLIC PNL TOTAL CA: CPT | Performed by: EMERGENCY MEDICINE

## 2019-04-22 PROCEDURE — 84484 ASSAY OF TROPONIN QUANT: CPT | Performed by: EMERGENCY MEDICINE

## 2019-04-22 RX ORDER — HYDRALAZINE HYDROCHLORIDE 20 MG/ML
5 INJECTION INTRAMUSCULAR; INTRAVENOUS ONCE
Status: DISCONTINUED | OUTPATIENT
Start: 2019-04-22 | End: 2019-04-23 | Stop reason: HOSPADM

## 2019-04-22 ASSESSMENT — ENCOUNTER SYMPTOMS
ABDOMINAL PAIN: 0
HEADACHES: 0
SHORTNESS OF BREATH: 0

## 2019-04-22 ASSESSMENT — MIFFLIN-ST. JEOR: SCORE: 1254.89

## 2019-04-22 NOTE — ED AVS SNAPSHOT
[facility] Emergency [facility]  [address]  [facility] [address]  Phone:  [phone]  Fax:  [phone]                                    Shayla Winter   MRN: [id number]    Department:  [facility] Emergency Department   Date of Visit:  4/22/2019           After Visit Summary Signature Page    I have received my discharge instructions, and my questions have been answered. I have discussed any challenges I see with this plan with the nurse or doctor.    ..........................................................................................................................................  Patient/Patient Representative Signature      ..........................................................................................................................................  Patient Representative Print Name and Relationship to Patient    ..................................................               ................................................  Date                                   Time    ..........................................................................................................................................  Reviewed by Signature/Title    ...................................................              ..............................................  Date                                               Time          22EPIC Rev 08/18

## 2019-04-23 ENCOUNTER — OFFICE VISIT (OUTPATIENT)
Dept: FAMILY MEDICINE | Facility: CLINIC | Age: 64
End: 2019-04-23

## 2019-04-23 VITALS
WEIGHT: 160 LBS | RESPIRATION RATE: 16 BRPM | TEMPERATURE: 98.5 F | HEART RATE: 105 BPM | SYSTOLIC BLOOD PRESSURE: 134 MMHG | HEIGHT: 63 IN | BODY MASS INDEX: 28.35 KG/M2 | DIASTOLIC BLOOD PRESSURE: 88 MMHG | OXYGEN SATURATION: 98 %

## 2019-04-23 DIAGNOSIS — I10 BENIGN ESSENTIAL HYPERTENSION: Primary | ICD-10-CM

## 2019-04-23 DIAGNOSIS — E66.3 OVERWEIGHT (BMI 25.0-29.9): ICD-10-CM

## 2019-04-23 DIAGNOSIS — E11.8 TYPE 2 DIABETES MELLITUS WITH COMPLICATION, WITHOUT LONG-TERM CURRENT USE OF INSULIN (H): ICD-10-CM

## 2019-04-23 LAB — INTERPRETATION ECG - MUSE: NORMAL

## 2019-04-23 PROCEDURE — 99214 OFFICE O/P EST MOD 30 MIN: CPT | Performed by: FAMILY MEDICINE

## 2019-04-23 RX ORDER — METOPROLOL SUCCINATE 25 MG/1
25 TABLET, EXTENDED RELEASE ORAL DAILY
Qty: 30 TABLET | Refills: 1 | Status: SHIPPED | OUTPATIENT
Start: 2019-04-23 | End: 2019-05-22

## 2019-04-23 SDOH — ECONOMIC STABILITY: FOOD INSECURITY: WITHIN THE PAST 12 MONTHS, YOU WORRIED THAT YOUR FOOD WOULD RUN OUT BEFORE YOU GOT MONEY TO BUY MORE.: NEVER TRUE

## 2019-04-23 SDOH — ECONOMIC STABILITY: FOOD INSECURITY: WITHIN THE PAST 12 MONTHS, THE FOOD YOU BOUGHT JUST DIDN'T LAST AND YOU DIDN'T HAVE MONEY TO GET MORE.: NEVER TRUE

## 2019-04-23 SDOH — ECONOMIC STABILITY: INCOME INSECURITY: HOW HARD IS IT FOR YOU TO PAY FOR THE VERY BASICS LIKE FOOD, HOUSING, MEDICAL CARE, AND HEATING?: NOT HARD AT ALL

## 2019-04-23 ASSESSMENT — MIFFLIN-ST. JEOR: SCORE: 1246.95

## 2019-04-23 NOTE — ED TRIAGE NOTES
Here for jorge alberto blood pressure. Saw endocrinologist this morning. Takes BP meds Valsartan/HCTZ, recently have change changed in drug manufacture on 3/31/19 but still same dose/med. Per patient, normally 140s/90s. Denies any symptoms. ABCs intact.

## 2019-04-23 NOTE — PROGRESS NOTES
"  SUBJECTIVE:  Shayla Winter is an 63 year old female who presents for evaluation of   Hypertension in the midst of getting better diabetes control from Endocrinology].    Yesterday she went to the ER because she was concerned about her BP readings 180/ ?.    She indicates that she is feeling well and   denies any symptoms referable to her elevated blood pressure.   Specifically denies chest pain, palpitations, dyspnea, orthopnea,   PND or peripheral edema. Current medication regimen is as listed   below. Patient denies any side effects of medication.    Family history: positive for hypertension, diabetes mellitus, cardiovascular disease and elevated cholesterol  Age at onset of elevated blood pressure: 41/ elevated cholesterol at age 35/ diabetes  Cardiovascular risk factors: family history, lipids, diabetes mellitus, hypertension, obesity and stress  Use of agents associated with hypertension: none  History of renal disease: negative  History of flank trauma: negative    Current Outpatient Medications   Medication     aspirin 81 MG tablet     atorvastatin (LIPITOR) 20 MG tablet     ibuprofen (ADVIL,MOTRIN) 600 MG tablet     linagliptin-metFORMIN ER (JENTADUETO XR) 5-1000 MG per tablet     metFORMIN (GLUCOPHAGE-XR) 500 MG 24 hr tablet     valsartan-hydrochlorothiazide (DIOVAN HCT) 160-12.5 MG per tablet     GABRIELA CONTOUR NEXT test strip     No current facility-administered medications for this visit.      Allergies   Allergen Reactions     Niacin Hives     hives,swollen     Adhesive Tape Rash       Social History     Tobacco Use     Smoking status: Never Smoker     Smokeless tobacco: Never Used   Substance Use Topics     Alcohol use: Yes     Alcohol/week: 0.5 oz     Types: 1 Standard drinks or equivalent per week     Comment: once a week       OBJECTIVE:  /88 (BP Location: Right arm, Patient Position: Sitting, Cuff Size: Adult Regular)   Pulse 105   Temp 98.5  F (36.9  C) (Oral)   Ht 1.588 m (5' 2.5\")  "  Wt 72.6 kg (160 lb)   LMP 09/29/2005   SpO2 98%   BMI 28.80 kg/m    Repeat BP R arm seated = 134/88 with large size cuff.  Fundi: deferred  Thyroid: normal to inspection and palpation  Lungs: negative, Percussion normal. Good diaphragmatic excursion. Lungs clear  Heart: negative, PMI normal. No lifts, heaves, or thrills. RRR. No murmurs, clicks gallops or rub  Peripheral pulses: radial=4/4, femoral=4/4, popliteal=4/4, dorsalis pedis=4/4,  Abd: The abdomen is soft without tenderness, guarding, mass or organomegaly. Bowel sounds are normal. No CVA tenderness or inguinal adenopathy noted.  BMI : Body mass index is 28.8 kg/m .      ER visit reviewed.    ASSESSMENT:(I10) Benign essential hypertension  (primary encounter diagnosis)  Plan: metoprolol succinate ER (TOPROL-XL) 25 MG 24 hr        tablet        1)  Medication: begin: metoprolol daily/ Potential medication side effects were discussed with the patient; let me know if any occur.    2)  Dietary sodium restriction  3)  Regular aerobic exercise  4)  Recheck in 1 month, sooner should new symptoms or   problems arise.    Patient Education: Reviewed risks of hypertension and principles of   treatment.            Patient Education: Reviewed risks of hypertension and principles of   treatment.

## 2019-04-23 NOTE — ED PROVIDER NOTES
History     Chief Complaint:  Hypertension    HPI   Shayla Winter is a 63 year old female who presents with hypertension. The patient reports that today she had an appointment with her endocrinologist and was told her blood pressure was high. She states she then checked her blood pressure throughout the day today and found her readings to be elevated prompting her to take a second Losartan. After taking the second Losartan today the patient states her blood pressure did come down to 137/77 at its best. Going into tonight she states that she continued to check her blood pressure and found it to be 180 systolic at its highest causing her to present to the ED for evaluation. Currently the patient states she feels completely normal denying any headaches, visual disturbances, chest pain, shortness of breath or abdominal pain. She states she was nervous going to her endocrinology appointment today over feeling guilty that she had gained weight and her A1c had gone up.  She adds that she has an appointment with her PCP for tomorrow to talk about her blood pressure and notes that her blood pressure typically hangs around 130/85 at baseline. The patient denies any changes in medication or dosage, but states that she noticed the the  for her Losartan changed within the past month.    Allergies:  Niacin  Adhesive Tape     Medications:    Aspirin 81 mg tablet  Lipitor  Jentadueto XR  Glucophage-XR  Diovan HCT  Ibuprofen   Losartan    Past Medical History:    Arthritis  Diabetes, type 2  Hyperlipidemia  Hypertension    Past Surgical History:    Right Direct Anterior Total Hip Arthroplasty   x 2  Treat Ectopic pregnancy, remove tube/ovary  Colonoscopy    Family History:    Hypertension  Lipids  Diabetes  CAD    Social History:  Smoking Status: Never Smoker  Alcohol Use: Yes  Patient presents alone.  Marital Status:       Review of Systems   Eyes: Negative for visual disturbance.   Respiratory:  "Negative for shortness of breath.    Cardiovascular: Negative for chest pain.   Gastrointestinal: Negative for abdominal pain.   Neurological: Negative for headaches.   All other systems reviewed and are negative.    Physical Exam     Patient Vitals for the past 24 hrs:   BP Temp Temp src Pulse Heart Rate Resp SpO2 Height Weight   04/22/19 2200 157/90 -- -- 93 96 17 94 % -- --   04/22/19 2145 (!) 165/94 -- -- 93 86 9 95 % -- --   04/22/19 2130 (!) 154/94 -- -- 94 90 11 96 % -- --   04/22/19 2115 (!) 179/107 -- -- 92 90 13 96 % -- --   04/22/19 2100 (!) 197/105 -- -- 86 89 10 97 % -- --   04/22/19 2045 (!) 185/104 -- -- 94 96 21 96 % -- --   04/22/19 2030 (!) 232/132 -- -- -- -- -- -- -- --   04/22/19 2027 (!) 213/124 98.9  F (37.2  C) Oral 94 94 16 97 % 1.6 m (5' 3\") 72.6 kg (160 lb)     Physical Exam  General: Alert, no acute distress; nontoxic appearing  Neuro:  PERRL.  EOMI.  Gait stable, no focal deficits  HEENT:  Moist mucous membranes.  Posterior oropharynx clear, no exudates.  Conjunctiva normal. TMs clear bilaterally  CV:  RRR, no m/r/g, skin warm and well perfused  Pulm:  CTAB, no wheezes/ronchi/rales.  No acute distress, breathing comfortably  GI:  Soft, nontender, nondistended.  No rebound or guarding.  Normal bowel sounds  MSK:  Moving all extremities.  No focal areas of edema, erythema, or tenderness  Skin:  WWP, no rashes, no lower extremity edema, skin color normal, no diaphoresis  Psych:  Well-appearing, normal affect, regular speech  Emergency Department Course     ECG (20:43:56):  Rate 92 bpm. OK interval 122 ms. QRS duration 84 ms. QT/QTc 380/469 ms. P-R-T axes 53 39 56.   Normal sinus rhythm.  Normal ECG.  Interpreted by Carlos Doe MD.    Laboratory:    2115: Troponin I: <0.015    BMP: Potassium 3.3 (L), Glucose 147 (H), o/w AWNL (Creatinine 0.52)    Interventions:    Apresoline 5 mg    Emergency Department Course:  Past medical records, nursing notes, and vitals reviewed.  2057: I " performed an exam of the patient and obtained history, as documented above.    IV inserted and blood drawn.    I rechecked the patient. Findings and plan explained to the Patient. Patient discharged home with instructions regarding supportive care, medications, and reasons to return. The importance of close follow-up was reviewed.      Impression & Plan      Medical Decision Making:  Shayla Winter is a 63 year old female with history significant for hypertension type 2 diabetes presents the emergency department for evaluation of hypertension initially noted at her endocrinology appointment today.  She otherwise denies any headaches, chest pain, shortness of breath.  No recent change to her high blood pressure medications and she has been compliant on this.  She is noted to be hypertensive as noted above.  The rest of her vitals are stable.  There is no evidence of any endorgan injury.  EKG and troponin are normal.  No signs of acute kidney injury.  Blood pressure did improve while in the emergency department with low-dose hydralazine.  Given asymptomatic HT, I do not feel that she requires admission, more aggressive BP management, or further work-up at this time.  She does have an appointment tomorrow to see her regular physician regarding her high blood pressure readings where they can discuss need for medication adjustments.  She will return for any worsening symptoms that were discussed at bedside.    Diagnosis:    ICD-10-CM    1. Asymptomatic hypertension I10 Basic metabolic panel (BMP)     Troponin I       Disposition:  Discharged to home.    Discharge Medications:     Medication List      There are no discharge medications for this visit.           Abby Lindo  4/22/2019   [facility] EMERGENCY DEPARTMENT  I, Abby Lindo, am serving as a scribe at 8:57 PM on 4/22/2019 to document services personally performed by Carlos Doe MD based on my observations and the provider's  statements to me.        Carlos Doe MD  04/22/19 [id number]

## 2019-04-23 NOTE — PATIENT INSTRUCTIONS
1)  Medication: begin: metoprolol daily/ Potential medication side effects were discussed with the patient; let me know if any occur.    2)  Dietary sodium restriction  3)  Regular aerobic exercise  4)  Recheck in 1 month, sooner should new symptoms or   problems arise.    Patient Education: Reviewed risks of hypertension and principles of   treatment.

## 2019-04-23 NOTE — NURSING NOTE
Shayla is here for a newer hypertension.          Pre-visit Screening:  Immunizations:  up to date  Colonoscopy:  is up to date  Mammogram: is up to date  Asthma Action Test/Plan:  NA  PHQ9:  None  GAD7:  None  Questioned patient about current smoking habits Pt. has never smoked.  Ok to leave detailed message on voice mail for today's visit only Yes, phone # [phone]

## 2019-05-22 ENCOUNTER — OFFICE VISIT (OUTPATIENT)
Dept: FAMILY MEDICINE | Facility: CLINIC | Age: 64
End: 2019-05-22

## 2019-05-22 VITALS
WEIGHT: 157.4 LBS | HEART RATE: 68 BPM | OXYGEN SATURATION: 98 % | TEMPERATURE: 99 F | SYSTOLIC BLOOD PRESSURE: 150 MMHG | RESPIRATION RATE: 18 BRPM | HEIGHT: 64 IN | DIASTOLIC BLOOD PRESSURE: 88 MMHG | BODY MASS INDEX: 26.87 KG/M2

## 2019-05-22 DIAGNOSIS — E78.2 MIXED HYPERLIPIDEMIA: ICD-10-CM

## 2019-05-22 DIAGNOSIS — Z79.899 ENCOUNTER FOR LONG-TERM (CURRENT) USE OF MEDICATIONS: ICD-10-CM

## 2019-05-22 DIAGNOSIS — I10 ESSENTIAL HYPERTENSION, BENIGN: ICD-10-CM

## 2019-05-22 DIAGNOSIS — E11.8 TYPE 2 DIABETES MELLITUS WITH COMPLICATION, WITHOUT LONG-TERM CURRENT USE OF INSULIN (H): ICD-10-CM

## 2019-05-22 DIAGNOSIS — Z01.411 ENCOUNTER FOR GYNECOLOGICAL EXAMINATION WITH ABNORMAL FINDING: Primary | ICD-10-CM

## 2019-05-22 LAB
ALBUMIN SERPL-MCNC: 4.4 G/DL (ref 3.6–5.1)
ALBUMIN URINE MG/G CR: <30 MG/G CREATININE
ALBUMIN URINE MG/SPEC: 10
ALP SERPL-CCNC: 56 U/L (ref 40–115)
ALT 1742-6: 6 U/L (ref 5–46)
AST 1920-8: 9 U/L (ref 7–35)
BILIRUB SERPL-MCNC: 0.6 MG/DL (ref 0.2–1.2)
BILIRUBIN DIRECT: 0.3 MG/DL (ref 0.1–0.4)
BUN SERPL-MCNC: 11 MG/DL (ref 7–25)
CALCIUM SERPL-MCNC: 9.9 MG/DL (ref 8.6–10.3)
CHLORIDE SERPLBLD-SCNC: 103.6 MMOL/L (ref 98–110)
CHOLEST SERPL-MCNC: 133 MG/DL (ref 0–199)
CHOLEST/HDLC SERPL: 2 {RATIO} (ref 0–5)
CO2 SERPL-SCNC: 26.5 MMOL/L (ref 20–32)
CREAT SERPL-MCNC: 0.67 MG/DL (ref 0.7–1.18)
CREATININE URINE: 10
GLUCOSE SERPL-MCNC: 150 MG/DL (ref 60–99)
HBA1C MFR BLD: 7.2 % (ref 4–7)
HDLC SERPL-MCNC: 55 MG/DL (ref 40–150)
HEMOGLOBIN: 13.2 G/DL (ref 11.7–15.7)
LDLC SERPL CALC-MCNC: 59 MG/DL (ref 0–99)
POTASSIUM SERPL-SCNC: 4.16 MMOL/L (ref 3.5–5.3)
PROT SERPL-MCNC: 6.9 G/DL (ref 6.1–8.1)
SODIUM SERPL-SCNC: 140.8 MMOL/L (ref 135–146)
TRIGL SERPL-MCNC: 97 MG/DL (ref 0–149)

## 2019-05-22 PROCEDURE — 82043 UR ALBUMIN QUANTITATIVE: CPT | Performed by: FAMILY MEDICINE

## 2019-05-22 PROCEDURE — 36415 COLL VENOUS BLD VENIPUNCTURE: CPT | Performed by: FAMILY MEDICINE

## 2019-05-22 PROCEDURE — 80053 COMPREHEN METABOLIC PANEL: CPT | Performed by: FAMILY MEDICINE

## 2019-05-22 PROCEDURE — 99396 PREV VISIT EST AGE 40-64: CPT | Performed by: FAMILY MEDICINE

## 2019-05-22 PROCEDURE — 80061 LIPID PANEL: CPT | Performed by: FAMILY MEDICINE

## 2019-05-22 PROCEDURE — 85018 HEMOGLOBIN: CPT | Performed by: FAMILY MEDICINE

## 2019-05-22 PROCEDURE — 83036 HEMOGLOBIN GLYCOSYLATED A1C: CPT | Performed by: FAMILY MEDICINE

## 2019-05-22 RX ORDER — ATORVASTATIN CALCIUM 20 MG/1
20 TABLET, FILM COATED ORAL DAILY
Qty: 90 TABLET | Refills: 1 | Status: SHIPPED | OUTPATIENT
Start: 2019-05-22 | End: 2019-11-18

## 2019-05-22 RX ORDER — VALSARTAN AND HYDROCHLOROTHIAZIDE 160; 12.5 MG/1; MG/1
1 TABLET, FILM COATED ORAL DAILY
Qty: 90 TABLET | Refills: 1 | Status: SHIPPED | OUTPATIENT
Start: 2019-05-22 | End: 2019-11-18

## 2019-05-22 RX ORDER — METOPROLOL SUCCINATE 25 MG/1
25 TABLET, EXTENDED RELEASE ORAL DAILY
Qty: 90 TABLET | Refills: 1 | Status: SHIPPED | OUTPATIENT
Start: 2019-05-22 | End: 2019-11-18 | Stop reason: DRUGHIGH

## 2019-05-22 SDOH — HEALTH STABILITY: MENTAL HEALTH: HOW MANY STANDARD DRINKS CONTAINING ALCOHOL DO YOU HAVE ON A TYPICAL DAY?: 1 OR 2

## 2019-05-22 SDOH — HEALTH STABILITY: MENTAL HEALTH: HOW OFTEN DO YOU HAVE 6 OR MORE DRINKS ON ONE OCCASION?: NEVER

## 2019-05-22 SDOH — HEALTH STABILITY: MENTAL HEALTH: HOW OFTEN DO YOU HAVE A DRINK CONTAINING ALCOHOL?: 2-4 TIMES A MONTH

## 2019-05-22 ASSESSMENT — MIFFLIN-ST. JEOR: SCORE: 1251.02

## 2019-05-22 NOTE — PROGRESS NOTES
SUBJECTIVE:  Shayla Winter is an 63 year old  postmenopausal woman   who presents for annual gyn exam. Menopause at age 50. No   bleeding, spotting, or discharge noted.     Estrogen replacement therapy: never  ENZO exposure: no  History of abnormal Pap smear: No  Family history of uterine or ovarian cancer: No  Regular self breast exam: Yes  History of abnormal mammogram: No  Family history of breast cancer: No  History of abnormal lipids: Yes: on medication/ type 2 diabetes    Past Medical History:  No date: Arthritis  No date: Diabetes mellitus (H)  1998: Other and unspecified hyperlipidemia     Comment:  Hyperlipidemia  : Unspecified essential hypertension     Comment:  Hypertension, Essential    Review of patient's family history indicates:  Problem: Hypertension     Relation: Mother         Age of Onset: (Not Specified)  Problem: Lipids     Relation: Mother         Age of Onset: (Not Specified)  Problem: Diabetes     Relation: Mother         Age of Onset: (Not Specified)         Comment: borderline  Problem: C.A.D.     Relation: Mother         Age of Onset: (Not Specified)  Problem: Hypertension     Relation: Father         Age of Onset: (Not Specified)  Problem: Diabetes     Relation: Other         Age of Onset: (Not Specified)         Comment: sister's daughter  Problem: Thyroid Disease     Relation: No family hx of         Age of Onset: (Not Specified)  Problem: Colon Cancer     Relation: No family hx of         Age of Onset: (Not Specified)      Past Surgical History:  2013: ARTHROPLASTY HIP ANTERIOR     Comment:  Procedure: ARTHROPLASTY HIP ANTERIOR;  RIGHT DIRECT               ANTERIOR TOTAL HIP ARTHROPLASTY (DEPUY)^ (HANA TABLE,               C-ARM);  Surgeon: Cricket Castellano MD;  Location:  OR  : C C-SEC ONLY,PREV C-SEC  : C TREAT ECTOPIC PREG,RMV TUBE/OVARY  2017: COLONOSCOPY; N/A     Comment:  Procedure: COLONOSCOPY;  Colonoscopy ;  Surgeon: Mirian                "Rachelle Hwang MD;  Location:  GI    Current Outpatient Medications:  aspirin 81 MG tablet   atorvastatin (LIPITOR) 20 MG tablet   GABRIELA CONTOUR NEXT test strip   linagliptin-metFORMIN ER (JENTADUETO XR) 5-1000 MG per tablet   metFORMIN (GLUCOPHAGE-XR) 500 MG 24 hr tablet   metoprolol succinate ER (TOPROL-XL) 25 MG 24 hr tablet   valsartan-hydrochlorothiazide (DIOVAN HCT) 160-12.5 MG per tablet   ibuprofen (ADVIL,MOTRIN) 600 MG tablet   No current facility-administered medications for this visit.    -- Niacin -- Hives   --  hives,swollen  -- Adhesive Tape -- Rash    Social History   Tobacco Use     Smoking status: Never Smoker     Smokeless tobacco: Never Used   Alcohol use: Yes     Alcohol/week: 0.5 oz     Types: 1 Standard drinks or equivalent per week     Frequency: 2-4 times a month     Drinks per session: 1 or 2     Binge frequency: Never     Comment: once a week      Review Of Systems  Ears/Nose/Throat: hearing loss  Respiratory: No shortness of breath, dyspnea on exertion, cough, or hemoptysis  Cardiovascular: working on BP control and on cholesterol medications  Gastrointestinal: negative  Genitourinary: negative    OBJECTIVE:  /88 (BP Location: Right arm, Patient Position: Sitting, Cuff Size: Adult Large)   Pulse 68   Temp 99  F (37.2  C) (Oral)   Ht 1.613 m (5' 3.5\")   Wt 71.4 kg (157 lb 6.4 oz)   LMP 09/29/2005   SpO2 98%   BMI 27.44 kg/m    General appearance: healthy, alert, no distress, cooperative, smiling and over weight  Skin: Skin color, texture, turgor normal. No rashes or lesions.  Ears: negative  Nose/Sinuses: Nares normal. Septum midline. Mucosa normal. No drainage or sinus tenderness.  Oropharynx: Lips, mucosa, and tongue normal. Teeth and gums normal.  Neck: Neck supple. No adenopathy. Thyroid symmetric, normal size,, Carotids without bruits.  Lungs: negative, Percussion normal. Good diaphragmatic excursion. Lungs clear  Heart: negative, PMI normal. No lifts, heaves, or thrills. " RRR. No murmurs, clicks gallops or rub  Breasts: Inspection negative. No nipple discharge or bleeding. No masses.  Abdomen: Abdomen soft, non-tender. BS normal. No masses, organomegaly  Pelvic: External genitalia and vagina normal. Bimanual and rectovaginal normal., positive findings:  vaginal mucosa atrophy  EXT: The lower extremities are normal and reveal no sign of DVT. Calves and thighs are soft and non tender, color is normal, no swelling or redness. Paty's sign is negative.  Pedal pulses are normal.  Normal fott exam including monofilament testing  BMI : Body mass index is 27.44 kg/m .    ASSESSMENT:  (Z01.411) Encounter for gynecological examination with abnormal finding  (primary encounter diagnosis)  Plan: VENOUS COLLECTION, CL AFF HEMOGLOBIN (BFP)        ASA daily recommended  Exercise encouraged  Fasting lipid profile obtained  Hemoglobin obtained  High fiber, low fat diet encouraged  Routine breast self-exam encouraged  Seat belt use encouraged  Sunscreen recommended  Weight loss recommended  Monitor BP and recheck 2-6 weeks. Here or with Endocrinology        (E11.8) Type 2 diabetes mellitus with complication, without long-term current use of insulin (H)  Plan: Hemoglobin A1c (BFP), VENOUS COLLECTION, C FOOT        EXAM  NO CHARGE, Albumin Random Urine         Quantitative with Creat Ratio, Lipid Panel         (BFP), Comprehensive Metobolic Panel (BFP),         atorvastatin (LIPITOR) 20 MG tablet,         valsartan-hydrochlorothiazide (DIOVAN HCT)         160-12.5 MG tablet, TSH with free T4 reflex         ([facility])        Back to Endocrinology      (E78.2) Mixed hyperlipidemia  Plan: VENOUS COLLECTION, Lipid Panel (BFP),         atorvastatin (LIPITOR) 20 MG tablet        1)  Medication: continue current medication regimen unchanged  2)  Low fat, low cholesterol diet  3)  Regular aerobic exercise  4)  Recheck in 6 months, sooner should new symptoms or   problems arise.    Patient Education: Reviewed risks  of elevated lipids and principles   of treatment.        (I10) Essential hypertension, benign  Plan: VENOUS COLLECTION, Comprehensive Metobolic         Panel (BFP), CL AFF HEMOGLOBIN (BFP),         valsartan-hydrochlorothiazide (DIOVAN HCT)         160-12.5 MG tablet, metoprolol succinate ER         (TOPROL-XL) 25 MG 24 hr tablet        1)  Medication: continue current medication regimen unchanged  2)  Dietary sodium restriction  3)  Regular aerobic exercise  4)  Recheck in 1 month, sooner should new symptoms or   problems arise.    Patient Education: Reviewed risks of hypertension and principles of   treatment.        (Z79.169) Encounter for long-term (current) use of medications  Plan: Hemoglobin A1c (BFP), VENOUS COLLECTION,         Albumin Random Urine Quantitative with Creat         Ratio, Lipid Panel (BFP), Comprehensive         Metobolic Panel (BFP), CL AFF HEMOGLOBIN (BFP)              Dx:  1)  Pap smear, mammogram  2)  Lipids at appropriate intervals    PE:  Reviewed health maintenance including diet, regular exercise,   estrogen replacement and periodic exams.

## 2019-05-22 NOTE — NURSING NOTE
Patient is here for a full physical exam.    Pre-Visit Screening :  Immunizations : up to date  Colon Screening : is up to date  Mammogram: up to date  Asthma Action Test/Plan : NA  PHQ9 :  PHQ 2 done today  GAD7 :  NA  Patient's  BMI Body mass index is 27.44 kg/m .  Questioned patient about current smoking habits.  Pt. has never smoked.  OK to leave a detailed voice message regarding today's visit Yes, phone # [phone]      ETOH screening: addressed in history

## 2019-05-22 NOTE — LETTER
May 24, 2019      Shayla Winter  [address]  [place] [address]        Dear ,    We are writing to inform you of your test results.    Better diabetes control is needed. Your labs reflect the elevation in glucose levels.  Normal thyroid level.      Resulted Orders   Hemoglobin A1c (BFP)   Result Value Ref Range    Hemoglobin A1C 7.2 (A) 4.0 - 7.0 %   Albumin Random Urine Quantitative with Creat Ratio   Result Value Ref Range    Albumin Urine mg/spec 10 <30    Albumin Urine mg/g Cr <30 <30 MG/G Creatinine    Creatinine Urine 10 <300   Lipid Panel (BFP)   Result Value Ref Range    Cholesterol 133 0 - 199 mg/dL    Triglycerides 97 0 - 149 mg/dL    HDL Cholesterol 55 40 - 150 mg/dL    LDL Cholesterol Direct 59 0 - 99 mg/dL    Cholesterol/HDL Ratio 2 0 - 5   Comprehensive Metobolic Panel (BFP)   Result Value Ref Range    Carbon Dioxide 26.5 20 - 32 mmol/L    Creatinine 0.67 (A) 0.70 - 1.18 mg/dL    Glucose 150 (A) 60 - 99 mg/dL    Sodium 140.8 135 - 146 mmol/L    Potassium 4.16 3.5 - 5.3 mmol/L    Chloride 103.6 98 - 110 mmol/L    Protein Total 6.9 6.1 - 8.1 g/dL    Albumin 4.4 3.6 - 5.1 g/dL    Alkaline Phosphatase 56 40 - 115 U/L    ALT 6 5 - 46 U/L    AST 9 7 - 35 U/L    Bilirubin Direct 0.3 0.1 - 0.4 mg/dL    Bilirubin Total 0.6 0.2 - 1.2 mg/dL    Urea Nitrogen 11 7 - 25 mg/dL    Calcium 9.9 8.6 - 10.3 mg/dL   CL AFF HEMOGLOBIN (BFP)   Result Value Ref Range    Hemoglobin 13.2 11.7 - 15.7 g/dL   TSH with free T4 reflex ([facility])   Result Value Ref Range    TSH 1.02 0.40 - 4.50 mIU/L       If you have any questions or concerns, please call the clinic at the number listed above.       Sincerely,        Latisha Flanagan MD

## 2019-05-22 NOTE — PATIENT INSTRUCTIONS
ASA daily recommended  Exercise encouraged  Fasting lipid profile obtained  Hemoglobin obtained  High fiber, low fat diet encouraged  Routine breast self-exam encouraged  Seat belt use encouraged  Sunscreen recommended  Weight loss recommended  Monitor BP and recheck 2-6 weeks. Here or with Endocrinology

## 2019-05-23 LAB — TSH SERPL-ACNC: 1.02 MIU/L (ref 0.4–4.5)

## 2019-07-11 ENCOUNTER — TRANSFERRED RECORDS (OUTPATIENT)
Dept: FAMILY MEDICINE | Facility: CLINIC | Age: 64
End: 2019-07-11

## 2019-07-22 ENCOUNTER — TRANSFERRED RECORDS (OUTPATIENT)
Dept: FAMILY MEDICINE | Facility: CLINIC | Age: 64
End: 2019-07-22

## 2019-09-21 DIAGNOSIS — E78.2 MIXED HYPERLIPIDEMIA: ICD-10-CM

## 2019-09-21 DIAGNOSIS — I10 ESSENTIAL HYPERTENSION, BENIGN: ICD-10-CM

## 2019-09-21 DIAGNOSIS — E11.8 TYPE 2 DIABETES MELLITUS WITH COMPLICATION, WITHOUT LONG-TERM CURRENT USE OF INSULIN (H): ICD-10-CM

## 2019-09-21 RX ORDER — ATORVASTATIN CALCIUM 20 MG/1
TABLET, FILM COATED ORAL
Qty: 90 TABLET | Refills: 1 | OUTPATIENT
Start: 2019-09-21

## 2019-09-21 RX ORDER — VALSARTAN AND HYDROCHLOROTHIAZIDE 160; 12.5 MG/1; MG/1
1 TABLET, FILM COATED ORAL DAILY
Qty: 90 TABLET | Refills: 1 | OUTPATIENT
Start: 2019-09-21

## 2019-09-21 NOTE — TELEPHONE ENCOUNTER
Refused Prescriptions:                       Disp   Refills    valsartan-hydrochlorothiazide (DIOVAN HCT)*90 tab*1        Sig: TAKE 1 TABLET BY MOUTH  DAILY  Refused By: NEYDA MANUEL  Reason for Refusal: Patient needs appointment    atorvastatin (LIPITOR) 20 MG tablet [Pharm*90 tab*1        Sig: TAKE 1 TABLET BY MOUTH  DAILY  Refused By: NEYDA MANUEL  Reason for Refusal: Patient needs appointment    Mail order denied     Last refill was 5- for six months  Due for a fasting ov in November  Neyda  [phone] (home)

## 2019-10-09 DIAGNOSIS — I10 ESSENTIAL HYPERTENSION, BENIGN: ICD-10-CM

## 2019-10-10 RX ORDER — METOPROLOL SUCCINATE 25 MG/1
TABLET, EXTENDED RELEASE ORAL
Qty: 90 TABLET | Refills: 1 | COMMUNITY
Start: 2019-10-10

## 2019-10-25 DIAGNOSIS — I10 ESSENTIAL HYPERTENSION, BENIGN: ICD-10-CM

## 2019-10-26 RX ORDER — METOPROLOL SUCCINATE 25 MG/1
TABLET, EXTENDED RELEASE ORAL
Qty: 90 TABLET | Refills: 1 | COMMUNITY
Start: 2019-10-26

## 2019-11-18 ENCOUNTER — OFFICE VISIT (OUTPATIENT)
Dept: FAMILY MEDICINE | Facility: CLINIC | Age: 64
End: 2019-11-18

## 2019-11-18 VITALS
WEIGHT: 146 LBS | RESPIRATION RATE: 16 BRPM | TEMPERATURE: 97.9 F | HEART RATE: 76 BPM | OXYGEN SATURATION: 98 % | BODY MASS INDEX: 25.87 KG/M2 | HEIGHT: 63 IN | SYSTOLIC BLOOD PRESSURE: 168 MMHG | DIASTOLIC BLOOD PRESSURE: 97 MMHG

## 2019-11-18 DIAGNOSIS — I10 ESSENTIAL HYPERTENSION, BENIGN: ICD-10-CM

## 2019-11-18 DIAGNOSIS — E11.8 TYPE 2 DIABETES MELLITUS WITH COMPLICATION, WITHOUT LONG-TERM CURRENT USE OF INSULIN (H): ICD-10-CM

## 2019-11-18 DIAGNOSIS — H61.22 IMPACTED CERUMEN OF LEFT EAR: ICD-10-CM

## 2019-11-18 DIAGNOSIS — H90.6 MIXED CONDUCTIVE AND SENSORINEURAL HEARING LOSS OF BOTH EARS: ICD-10-CM

## 2019-11-18 DIAGNOSIS — Z79.899 ENCOUNTER FOR LONG-TERM (CURRENT) USE OF MEDICATIONS: ICD-10-CM

## 2019-11-18 DIAGNOSIS — E78.2 MIXED HYPERLIPIDEMIA: Primary | ICD-10-CM

## 2019-11-18 LAB
ALBUMIN SERPL-MCNC: 4.7 G/DL (ref 3.6–5.1)
ALBUMIN/GLOB SERPL: 2 {RATIO} (ref 1–2.5)
ALP SERPL-CCNC: 59 U/L (ref 33–130)
ALT 1742-6: <5 U/L (ref 5–30)
AST 1920-8: 6 U/L (ref 7–31)
BILIRUB SERPL-MCNC: 0.7 MG/DL (ref 0.2–1.2)
BUN SERPL-MCNC: 12 MG/DL (ref 7–25)
BUN/CREATININE RATIO: 19.4 (ref 6–22)
CALCIUM SERPL-MCNC: 9.7 MG/DL (ref 8.6–10.3)
CHLORIDE SERPLBLD-SCNC: 101.3 MMOL/L (ref 98–110)
CHOLEST SERPL-MCNC: 182 MG/DL (ref 0–199)
CHOLEST/HDLC SERPL: 3 {RATIO} (ref 0–5)
CO2 SERPL-SCNC: 28.7 MMOL/L (ref 20–32)
CREAT SERPL-MCNC: 0.62 MG/DL (ref 0.7–1.18)
GLOBULIN, CALCULATED - QUEST: 2.3 (ref 1.9–3.7)
GLUCOSE SERPL-MCNC: 154 MG/DL (ref 60–99)
HDLC SERPL-MCNC: 68 MG/DL (ref 40–150)
LDLC SERPL CALC-MCNC: 89 MG/DL (ref 0–130)
POTASSIUM SERPL-SCNC: 4.42 MMOL/L (ref 3.5–5.3)
PROT SERPL-MCNC: 7 G/DL (ref 6.1–8.1)
SODIUM SERPL-SCNC: 139.9 MMOL/L (ref 135–146)
TRIGL SERPL-MCNC: 125 MG/DL (ref 0–149)

## 2019-11-18 PROCEDURE — 99214 OFFICE O/P EST MOD 30 MIN: CPT | Mod: 25 | Performed by: FAMILY MEDICINE

## 2019-11-18 PROCEDURE — 80053 COMPREHEN METABOLIC PANEL: CPT | Performed by: FAMILY MEDICINE

## 2019-11-18 PROCEDURE — 36415 COLL VENOUS BLD VENIPUNCTURE: CPT | Performed by: FAMILY MEDICINE

## 2019-11-18 PROCEDURE — 69209 REMOVE IMPACTED EAR WAX UNI: CPT | Performed by: FAMILY MEDICINE

## 2019-11-18 PROCEDURE — 80061 LIPID PANEL: CPT | Performed by: FAMILY MEDICINE

## 2019-11-18 RX ORDER — ATORVASTATIN CALCIUM 20 MG/1
20 TABLET, FILM COATED ORAL DAILY
Qty: 90 TABLET | Refills: 1 | Status: SHIPPED | OUTPATIENT
Start: 2019-11-18 | End: 2020-05-19

## 2019-11-18 RX ORDER — METOPROLOL SUCCINATE 50 MG/1
50 TABLET, EXTENDED RELEASE ORAL DAILY
Qty: 90 TABLET | Refills: 0 | Status: SHIPPED | OUTPATIENT
Start: 2019-11-18 | End: 2020-01-31

## 2019-11-18 RX ORDER — VALSARTAN AND HYDROCHLOROTHIAZIDE 160; 12.5 MG/1; MG/1
1 TABLET, FILM COATED ORAL DAILY
Qty: 90 TABLET | Refills: 1 | Status: SHIPPED | OUTPATIENT
Start: 2019-11-18 | End: 2020-05-19

## 2019-11-18 SDOH — ECONOMIC STABILITY: TRANSPORTATION INSECURITY
IN THE PAST 12 MONTHS, HAS THE LACK OF TRANSPORTATION KEPT YOU FROM MEDICAL APPOINTMENTS OR FROM GETTING MEDICATIONS?: NO

## 2019-11-18 SDOH — ECONOMIC STABILITY: TRANSPORTATION INSECURITY
IN THE PAST 12 MONTHS, HAS LACK OF TRANSPORTATION KEPT YOU FROM MEETINGS, WORK, OR FROM GETTING THINGS NEEDED FOR DAILY LIVING?: NO

## 2019-11-18 ASSESSMENT — MIFFLIN-ST. JEOR: SCORE: 1182.41

## 2019-11-18 NOTE — PATIENT INSTRUCTIONS
1)  Medication: dosage change: metoprolol to 50 mgm/ start checking home BP readings  2)  Dietary sodium restriction  3)  Regular aerobic exercise  4)  Recheck in 3 months, sooner should new symptoms or   problems arise.    Patient Education: Reviewed risks of hypertension and principles of   treatment.

## 2019-11-18 NOTE — PROGRESS NOTES
SUBJECTIVE:  Shayla Winter is an 64 year old female who presents for evaluation of   Hyperlipidemia and hypertension/ type 2 diabetes followed by Endocrinology. Working on weight loss.    She has been diagnosed in the past as having   combined hyperlipidemia. She indicates that she is feeling well   and denies any symptoms of cardiovascular disease. Specifically   denies chest pain, palpitations, dyspnea, orthopnea, PND,   claudication or peripheral edema. Treatment modalities employed to   this point include diet, regular aerobic exercise, weight loss and Lipitor. Current medication   regimen is as listed below. Patient denies any side effects of   medication.    Family history: positive for hypertension, cardiovascular disease and elevated lipids  Age at diagnosis of hyperlipidemia: 44 and hyperlipidemia age 41  Cardiovascular risk factors: family history, lipids, diabetes mellitus, hypertension, obesity and stress    Current Outpatient Medications   Medication     aspirin 81 MG tablet     atorvastatin (LIPITOR) 20 MG tablet     GABRIELA CONTOUR NEXT test strip     ibuprofen (ADVIL,MOTRIN) 600 MG tablet     linagliptin-metFORMIN ER (JENTADUETO XR) 5-1000 MG per tablet     metFORMIN (GLUCOPHAGE-XR) 500 MG 24 hr tablet     metoprolol succinate ER (TOPROL-XL) 25 MG 24 hr tablet     valsartan-hydrochlorothiazide (DIOVAN HCT) 160-12.5 MG tablet     No current facility-administered medications for this visit.      Allergies   Allergen Reactions     Niacin Hives     hives,swollen     Adhesive Tape Rash       Social History     Tobacco Use     Smoking status: Never Smoker     Smokeless tobacco: Never Used   Substance Use Topics     Alcohol use: Yes     Alcohol/week: 0.8 standard drinks     Types: 1 Standard drinks or equivalent per week     Frequency: 2-4 times a month     Drinks per session: 1 or 2     Binge frequency: Never     Comment: once a week       OBJECTIVE:  BP (!) 148/86 (BP Location: Right arm, Patient  "Position: Sitting, Cuff Size: Adult Large)   Pulse 76   Temp 97.9  F (36.6  C) (Oral)   Resp 16   Ht 1.594 m (5' 2.75\")   Wt 66.2 kg (146 lb)   LMP 09/29/2005   SpO2 98%   BMI 26.07 kg/m    Repeat BP R arm seated = 148/64 repeated with large size cuff.  Skin: negative  Fundi: deferred  Lungs: negative, Percussion normal. Good diaphragmatic excursion. Lungs clear  Heart: negative, PMI normal. No lifts, heaves, or thrills. RRR. No murmurs, clicks gallops or rub  Peripheral pulses: radial=4/4, femoral=4/4, popliteal=4/4, dorsalis pedis=4/4,  Abd; The abdomen is soft without tenderness, guarding, mass or organomegaly. Bowel sounds are normal. No CVA tenderness or inguinal adenopathy noted.  BMI : Body mass index is 26.07 kg/m .    ASSESSMENT:(E78.2) Mixed hyperlipidemia  (primary encounter diagnosis)  Plan: Lipid Panel (BFP), VENOUS COLLECTION,         atorvastatin (LIPITOR) 20 MG tablet        1)  Medication: continue current medication regimen unchanged  2)  Low fat, low cholesterol diet  3)  Regular aerobic exercise  4)  Recheck in 6 months, sooner should new symptoms or   problems arise.    Patient Education: Reviewed risks of elevated lipids and principles   of treatment.        (I10) Essential hypertension, benign  Plan: Comprehensive Metobolic Panel (BFP), VENOUS         COLLECTION, valsartan-hydrochlorothiazide         (DIOVAN HCT) 160-12.5 MG tablet, metoprolol         succinate ER (TOPROL-XL) 50 MG 24 hr tablet        1)  Medication: dosage change: metoprolol to 50 mgm/ start checking home BP readings  2)  Dietary sodium restriction  3)  Regular aerobic exercise  4)  Recheck in 3 months, sooner should new symptoms or   problems arise.    Patient Education: Reviewed risks of hypertension and principles of   treatment.        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      (H61.22) Impacted cerumen of left ear  Plan: Removal Impacted Cerumen Irrigation Unilateral         (Ear Wash)            (E11.8) Type 2 diabetes mellitus with complication, without long-term current use of insulin (H)  Plan: Lipid Panel (BFP), Comprehensive Metobolic         Panel (BFP), VENOUS COLLECTION,         valsartan-hydrochlorothiazide (DIOVAN HCT)         160-12.5 MG tablet, atorvastatin (LIPITOR) 20         MG tablet, CANCELED: Hemoglobin A1c (BFP)        I have reviewed the patient's medical history in detail and updated the computerized patient record.      (Z79.899) Encounter for long-term (current) use of medications  Plan: Lipid Panel (BFP), Comprehensive Metobolic         Panel (BFP), VENOUS COLLECTION, CANCELED:         Hemoglobin A1c (BFP)        I have reviewed the patient's medical history in detail and updated the computerized patient record.

## 2019-11-18 NOTE — LETTER
November 20, 2019      Shayla Winter  [address]  [place] [address]        Dear ,    We are writing to inform you of your test results.    Your test results fall within the expected range(s) or remain unchanged from previous results.  Please continue with current treatment plan.    Resulted Orders   Lipid Panel (BFP)   Result Value Ref Range    Cholesterol 182 0 - 199 mg/dL    Triglycerides 125 0 - 149 mg/dL    HDL Cholesterol 68 40 - 150 mg/dL    LDL Cholesterol Direct 89 0 - 130 mg/dL    Cholesterol/HDL Ratio 3 0 - 5   Comprehensive Metobolic Panel (BFP)   Result Value Ref Range    Carbon Dioxide 28.7 20 - 32 mmol/L    Creatinine 0.62 (A) 0.70 - 1.18 mg/dL    Glucose 154 (A) 60 - 99 mg/dL    Sodium 139.9 135 - 146 mmol/L    Potassium 4.42 3.5 - 5.3 mmol/L    Chloride 101.3 98 - 110 mmol/L    Protein Total 7.0 6.1 - 8.1 g/dL    Albumin 4.7 3.6 - 5.1 g/dL    Alkaline Phosphatase 59 33 - 130 U/L    ALT <5 (A) 5 - 30 U/L    AST 6 (A) 7 - 31 U/L    Bilirubin Total 0.7 0.2 - 1.2 mg/dL    Urea Nitrogen 12 7 - 25 mg/dL    Calcium 9.7 8.6 - 10.3 mg/dL    BUN/Creatinine Ratio 19.4 6 - 22    Globulin Calculated 2.3 1.9 - 3.7    A/G Ratio 2.0 1 - 2.5       If you have any questions or concerns, please call the clinic at the number listed above.       Sincerely,        Latisha Flanagan MD

## 2019-11-18 NOTE — NURSING NOTE
Shayla is here for a fasting med check.          Pre-Visit Screening :  Immunizations : up to date  Colon Screening : is up to date  Mammogram: is up date  Asthma Action Test/Plan : NA  PHQ9 :  None  GAD7 :  None  Patient's  BMI Body mass index is 26.07 kg/m .  Questioned patient about current smoking habits.  Pt. has never smoked.  OK to leave a detailed voice message regarding today's visit Yes, phone # [phone]

## 2019-11-20 ENCOUNTER — TELEPHONE (OUTPATIENT)
Dept: FAMILY MEDICINE | Facility: CLINIC | Age: 64
End: 2019-11-20

## 2019-11-20 NOTE — TELEPHONE ENCOUNTER
Spoke to Shayla to let her know of LES's response.  Se will bring her cuff in on Friday and have it compared to ours and will wait the 7-14 days

## 2019-11-20 NOTE — TELEPHONE ENCOUNTER
Shayla called to say that when she saw you last her BP had increased so you added metoprolol 50 mg.  She has been taking the medicine for 2 days and checking her BP at home  It is still in the 170 to 180 range systolic and 99 to 100 diastolic.  She is concerned so wondering if you want her to increase the other BP med Valsartan.  Please advise    Patient's phone #[phone]

## 2019-11-20 NOTE — TELEPHONE ENCOUNTER
It usually takes 7-14 days for your blood pressure to respond to new medication or new dose. Let's give it the time needed to see if we are making a change.

## 2019-11-22 ENCOUNTER — ALLIED HEALTH/NURSE VISIT (OUTPATIENT)
Dept: FAMILY MEDICINE | Facility: CLINIC | Age: 64
End: 2019-11-22

## 2019-11-22 VITALS — SYSTOLIC BLOOD PRESSURE: 136 MMHG | DIASTOLIC BLOOD PRESSURE: 75 MMHG

## 2019-11-22 DIAGNOSIS — Z01.30 BLOOD PRESSURE CHECK: Primary | ICD-10-CM

## 2019-11-22 PROCEDURE — 99211 OFF/OP EST MAY X REQ PHY/QHP: CPT | Performed by: FAMILY MEDICINE

## 2019-12-06 ENCOUNTER — TRANSFERRED RECORDS (OUTPATIENT)
Dept: FAMILY MEDICINE | Facility: CLINIC | Age: 64
End: 2019-12-06

## 2019-12-09 ENCOUNTER — TRANSFERRED RECORDS (OUTPATIENT)
Dept: FAMILY MEDICINE | Facility: CLINIC | Age: 64
End: 2019-12-09

## 2020-01-12 DIAGNOSIS — I10 ESSENTIAL HYPERTENSION, BENIGN: ICD-10-CM

## 2020-01-13 RX ORDER — METOPROLOL SUCCINATE 50 MG/1
TABLET, EXTENDED RELEASE ORAL
Qty: 90 TABLET | Refills: 0 | OUTPATIENT
Start: 2020-01-13

## 2020-01-13 NOTE — TELEPHONE ENCOUNTER
Refused Prescriptions:                       Disp   Refills    metoprolol succinate ER (TOPROL-XL) 50 MG *90 tab*0        Sig: TAKE 1 TABLET BY MOUTH  DAILY  Refused By: NEYDA MANUEL  Reason for Refusal: Patient needs appointment    Per notes on 11- rtc in three months due in Feb  Refill for 90 days  No Future Appt's for pt   Neyda

## 2020-01-28 DIAGNOSIS — I10 ESSENTIAL HYPERTENSION, BENIGN: ICD-10-CM

## 2020-01-29 RX ORDER — METOPROLOL SUCCINATE 50 MG/1
TABLET, EXTENDED RELEASE ORAL
Qty: 90 TABLET | Refills: 0 | OUTPATIENT
Start: 2020-01-29

## 2020-01-29 NOTE — TELEPHONE ENCOUNTER
Refused Prescriptions:                       Disp   Refills    metoprolol succinate ER (TOPROL-XL) 50 MG *90 tab*0        Sig: TAKE 1 TABLET BY MOUTH  DAILY  Refused By: NEYDA MANUEL  Reason for Refusal: Patient needs appointment    Mail order     Pt has an louisa on 1-    Per notes on 11- rtc in three months due in Feb  Refill for 90 days     Neyda

## 2020-01-31 ENCOUNTER — OFFICE VISIT (OUTPATIENT)
Dept: FAMILY MEDICINE | Facility: CLINIC | Age: 65
End: 2020-01-31

## 2020-01-31 VITALS
WEIGHT: 146.6 LBS | TEMPERATURE: 99.2 F | HEIGHT: 63 IN | HEART RATE: 80 BPM | DIASTOLIC BLOOD PRESSURE: 72 MMHG | RESPIRATION RATE: 18 BRPM | BODY MASS INDEX: 25.98 KG/M2 | OXYGEN SATURATION: 98 % | SYSTOLIC BLOOD PRESSURE: 132 MMHG

## 2020-01-31 DIAGNOSIS — I10 ESSENTIAL HYPERTENSION, BENIGN: ICD-10-CM

## 2020-01-31 PROCEDURE — 99213 OFFICE O/P EST LOW 20 MIN: CPT | Performed by: FAMILY MEDICINE

## 2020-01-31 RX ORDER — METOPROLOL SUCCINATE 50 MG/1
50 TABLET, EXTENDED RELEASE ORAL DAILY
Qty: 90 TABLET | Refills: 0 | Status: SHIPPED | OUTPATIENT
Start: 2020-01-31 | End: 2020-03-27

## 2020-01-31 ASSESSMENT — MIFFLIN-ST. JEOR: SCORE: 1189.1

## 2020-01-31 NOTE — PROGRESS NOTES
SUBJECTIVE:  Shayla Winter is an 64 year old female who presents for evaluation of   Hypertension after increase in metoprolol from 25 to 50 mgm for better BP control 2 months ago plus.     She indicates that she is feeling well and   denies any symptoms referable to her elevated blood pressure.   Specifically denies chest pain, palpitations, dyspnea, orthopnea,   PND or peripheral edema. Current medication regimen is as listed   below. Patient denies any side effects of medication.    Family history: positive for hypertension, diabetes mellitus, cardiovascular disease and elevated lipids  Age at onset of elevated blood pressure: 41  Cardiovascular risk factors: family history, lipids, diabetes mellitus, hypertension, obesity and stress  Use of agents associated with hypertension: none  History of renal disease: negative  History of flank trauma: negative    Current Outpatient Medications   Medication     aspirin 81 MG tablet     atorvastatin (LIPITOR) 20 MG tablet     GABRIELA CONTOUR NEXT test strip     ibuprofen (ADVIL,MOTRIN) 600 MG tablet     linagliptin-metFORMIN ER (JENTADUETO XR) 5-1000 MG per tablet     metFORMIN (GLUCOPHAGE-XR) 500 MG 24 hr tablet     metoprolol succinate ER (TOPROL-XL) 50 MG 24 hr tablet     valsartan-hydrochlorothiazide (DIOVAN HCT) 160-12.5 MG tablet     No current facility-administered medications for this visit.      Allergies   Allergen Reactions     Niacin Hives     hives,swollen     Adhesive Tape Rash       Social History     Tobacco Use     Smoking status: Never Smoker     Smokeless tobacco: Never Used   Substance Use Topics     Alcohol use: Yes     Alcohol/week: 0.8 standard drinks     Types: 1 Standard drinks or equivalent per week     Frequency: 2-4 times a month     Drinks per session: 1 or 2     Binge frequency: Never     Comment: once a week       OBJECTIVE:  /72 (BP Location: Left arm, Patient Position: Sitting, Cuff Size: Adult Large)   Pulse 80   Temp 99.2  F  "(37.3  C) (Oral)   Resp 18   Ht 1.6 m (5' 3\")   Wt 66.5 kg (146 lb 9.6 oz)   LMP 09/29/2005   SpO2 98%   BMI 25.97 kg/m    Repeat BP R arm seated = 132/72  with regular size cuff.  Fundi: deferred  Thyroid: normal to inspection and palpation  Lungs: negative, Percussion normal. Good diaphragmatic excursion. Lungs clear  Heart: negative, PMI normal. No lifts, heaves, or thrills. RRR. No murmurs, clicks gallops or rub  Peripheral pulses: radial=4/4, femoral=4/4, popliteal=4/4, dorsalis pedis=4/4,  Abd: The abdomen is soft without tenderness, guarding, mass or organomegaly. Bowel sounds are normal. No CVA tenderness or inguinal adenopathy noted.  BMI : Body mass index is 25.97 kg/m .    ASSESSMENT:  Essential hypertension    Plan:  1)  Medication: continue current medication regimen unchanged  2)  Dietary sodium restriction  3)  Regular aerobic exercise  4)  Recheck in 3 months fasting with GYN exam, sooner should new symptoms or   problems arise.    Patient Education: Reviewed risks of hypertension and principles of   treatment.  "

## 2020-01-31 NOTE — NURSING NOTE
Shayla is here for med check non fasting    Pre-visit Screening:  Immunizations:  up to date  Colonoscopy:  is up to date  Mammogram: is up to date  Asthma Action Test/Plan:  NA  PHQ9:  NA  GAD7:  NA  Questioned patient about current smoking habits Pt. has never smoked.  Ok to leave detailed message on voice mail for today's visit only Yes, phone # [phone]

## 2020-01-31 NOTE — PATIENT INSTRUCTIONS
Plan:  1)  Medication: continue current medication regimen unchanged  2)  Dietary sodium restriction  3)  Regular aerobic exercise  4)  Recheck in 3 months fasting with GYN exam, sooner should new symptoms or   problems arise.    Patient Education: Reviewed risks of hypertension and principles of   treatment.

## 2020-02-04 ENCOUNTER — TRANSFERRED RECORDS (OUTPATIENT)
Dept: FAMILY MEDICINE | Facility: CLINIC | Age: 65
End: 2020-02-04

## 2020-02-04 LAB — HBA1C MFR BLD: 7 % (ref 4–6)

## 2020-03-26 DIAGNOSIS — I10 ESSENTIAL HYPERTENSION, BENIGN: ICD-10-CM

## 2020-03-27 RX ORDER — METOPROLOL SUCCINATE 50 MG/1
TABLET, EXTENDED RELEASE ORAL
Qty: 90 TABLET | Refills: 0 | Status: SHIPPED | OUTPATIENT
Start: 2020-03-27 | End: 2020-05-19

## 2020-03-27 NOTE — TELEPHONE ENCOUNTER
Was to F/U in 3 months with GYN exam in April, but we are not scheduling them at this time.    She is requesting from mail order.   Please advise    Thanks,Ayah Winter is requesting a refill of:    Pending Prescriptions:                       Disp   Refills    metoprolol succinate ER (TOPROL-XL) 50 MG*90 tab*0            Sig: TAKE 1 TABLET BY MOUTH  DAILY

## 2020-04-01 DIAGNOSIS — E11.8 TYPE 2 DIABETES MELLITUS WITH COMPLICATION, WITHOUT LONG-TERM CURRENT USE OF INSULIN (H): ICD-10-CM

## 2020-04-01 DIAGNOSIS — I10 ESSENTIAL HYPERTENSION, BENIGN: ICD-10-CM

## 2020-04-01 DIAGNOSIS — E78.2 MIXED HYPERLIPIDEMIA: ICD-10-CM

## 2020-04-01 RX ORDER — ATORVASTATIN CALCIUM 20 MG/1
TABLET, FILM COATED ORAL
Qty: 90 TABLET | Refills: 1 | COMMUNITY
Start: 2020-04-01

## 2020-04-01 RX ORDER — VALSARTAN AND HYDROCHLOROTHIAZIDE 160; 12.5 MG/1; MG/1
1 TABLET, FILM COATED ORAL DAILY
Qty: 90 TABLET | Refills: 1 | COMMUNITY
Start: 2020-04-01

## 2020-04-01 NOTE — TELEPHONE ENCOUNTER
Shayla Winter is requesting a refill of:    Refused Prescriptions:                       Disp   Refills    valsartan-hydrochlorothiazide (DIOVAN HCT)*90 tab*1        Sig: TAKE 1 TABLET BY MOUTH  DAILY  Refused By: ABDIAZIZ BIGGS  Reason for Refusal: Patient has requested refill too soon    atorvastatin (LIPITOR) 20 MG tablet [Pharm*90 tab*1        Sig: TAKE 1 TABLET BY MOUTH  DAILY  Refused By: ABDIAZIZ BIGGS  Reason for Refusal: Patient has requested refill too soon    Refilled 11/18/19 for 6 months.

## 2020-04-13 DIAGNOSIS — E11.8 TYPE 2 DIABETES MELLITUS WITH COMPLICATION, WITHOUT LONG-TERM CURRENT USE OF INSULIN (H): ICD-10-CM

## 2020-04-13 DIAGNOSIS — E78.2 MIXED HYPERLIPIDEMIA: ICD-10-CM

## 2020-04-13 DIAGNOSIS — I10 ESSENTIAL HYPERTENSION, BENIGN: ICD-10-CM

## 2020-04-14 RX ORDER — ATORVASTATIN CALCIUM 20 MG/1
TABLET, FILM COATED ORAL
Qty: 90 TABLET | Refills: 1 | COMMUNITY
Start: 2020-04-14

## 2020-04-14 RX ORDER — VALSARTAN AND HYDROCHLOROTHIAZIDE 160; 12.5 MG/1; MG/1
1 TABLET, FILM COATED ORAL DAILY
Qty: 90 TABLET | Refills: 1 | COMMUNITY
Start: 2020-04-14

## 2020-04-14 NOTE — TELEPHONE ENCOUNTER
Shayla Winter is requesting a refill of:    Refused Prescriptions:                       Disp   Refills    atorvastatin (LIPITOR) 20 MG tablet [Pharm*90 tab*1        Sig: TAKE 1 TABLET BY MOUTH  DAILY  Refused By: ABDIAZIZ BIGGS  Reason for Refusal: Patient has requested refill too soon    valsartan-hydrochlorothiazide (DIOVAN HCT)*90 tab*1        Sig: TAKE 1 TABLET BY MOUTH  DAILY  Refused By: ABDIAZIZ BIGGS  Reason for Refusal: Patient has requested refill too soon    Refilled 11/18/19 for 6 months. Pt should not be out until May.

## 2020-05-19 ENCOUNTER — OFFICE VISIT (OUTPATIENT)
Dept: FAMILY MEDICINE | Facility: CLINIC | Age: 65
End: 2020-05-19

## 2020-05-19 VITALS
HEIGHT: 63 IN | WEIGHT: 147.6 LBS | SYSTOLIC BLOOD PRESSURE: 128 MMHG | BODY MASS INDEX: 26.15 KG/M2 | RESPIRATION RATE: 20 BRPM | OXYGEN SATURATION: 99 % | DIASTOLIC BLOOD PRESSURE: 78 MMHG | TEMPERATURE: 98.8 F | HEART RATE: 76 BPM

## 2020-05-19 DIAGNOSIS — I10 ESSENTIAL HYPERTENSION, BENIGN: ICD-10-CM

## 2020-05-19 DIAGNOSIS — E11.8 TYPE 2 DIABETES MELLITUS WITH COMPLICATION, WITHOUT LONG-TERM CURRENT USE OF INSULIN (H): ICD-10-CM

## 2020-05-19 DIAGNOSIS — Z79.899 ENCOUNTER FOR LONG-TERM (CURRENT) USE OF MEDICATIONS: ICD-10-CM

## 2020-05-19 DIAGNOSIS — E78.2 MIXED HYPERLIPIDEMIA: ICD-10-CM

## 2020-05-19 DIAGNOSIS — Z01.411 ENCOUNTER FOR GYNECOLOGICAL EXAMINATION WITH ABNORMAL FINDING: Primary | ICD-10-CM

## 2020-05-19 LAB
% GRANULOCYTES: 69.9 %
ALBUMIN SERPL-MCNC: 4.6 G/DL (ref 3.6–5.1)
ALBUMIN URINE MG/G CR: <30 MG/G CREATININE
ALBUMIN URINE MG/SPEC: 10
ALBUMIN/GLOB SERPL: 1.9 {RATIO} (ref 1–2.5)
ALP SERPL-CCNC: 62 U/L (ref 33–130)
ALT 1742-6: 2 U/L (ref 0–32)
AST 1920-8: 4 U/L (ref 0–35)
BILIRUB SERPL-MCNC: 0.8 MG/DL (ref 0.2–1.2)
BUN SERPL-MCNC: 14 MG/DL (ref 7–25)
BUN/CREATININE RATIO: 20.6 (ref 6–22)
CALCIUM SERPL-MCNC: 9.7 MG/DL (ref 8.6–10.3)
CHLORIDE SERPLBLD-SCNC: 98.2 MMOL/L (ref 98–110)
CHOLEST SERPL-MCNC: 162 MG/DL (ref 0–199)
CHOLEST/HDLC SERPL: 3 {RATIO} (ref 0–5)
CO2 SERPL-SCNC: 27.4 MMOL/L (ref 20–32)
CREAT SERPL-MCNC: 0.68 MG/DL (ref 0.7–1.18)
CREATININE URINE: 10
GLOBULIN, CALCULATED - QUEST: 2.4 (ref 1.9–3.7)
GLUCOSE SERPL-MCNC: 164 MG/DL (ref 60–99)
HBA1C MFR BLD: 6.9 % (ref 4–7)
HCT VFR BLD AUTO: 41.7 % (ref 35–47)
HDLC SERPL-MCNC: 61 MG/DL (ref 40–150)
HEMOGLOBIN: 14 G/DL (ref 11.7–15.7)
LDLC SERPL CALC-MCNC: 77 MG/DL (ref 0–130)
LYMPHOCYTES NFR BLD AUTO: 22.3 %
MCH RBC QN AUTO: 32.1 PG (ref 26–33)
MCHC RBC AUTO-ENTMCNC: 33.6 G/DL (ref 31–36)
MCV RBC AUTO: 95.7 FL (ref 78–100)
MONOCYTES NFR BLD AUTO: 7.8 %
PLATELET COUNT - QUEST: 355 10^9/L (ref 150–375)
POTASSIUM SERPL-SCNC: 3.88 MMOL/L (ref 3.5–5.3)
PROT SERPL-MCNC: 7 G/DL (ref 6.1–8.1)
RBC # BLD AUTO: 4.36 10*12/L (ref 3.8–5.2)
SODIUM SERPL-SCNC: 135.6 MMOL/L (ref 135–146)
TRIGL SERPL-MCNC: 121 MG/DL (ref 0–149)
WBC # BLD AUTO: 7.8 10*9/L (ref 4–11)

## 2020-05-19 PROCEDURE — 36415 COLL VENOUS BLD VENIPUNCTURE: CPT | Performed by: FAMILY MEDICINE

## 2020-05-19 PROCEDURE — 85025 COMPLETE CBC W/AUTO DIFF WBC: CPT | Performed by: FAMILY MEDICINE

## 2020-05-19 PROCEDURE — 82043 UR ALBUMIN QUANTITATIVE: CPT | Performed by: FAMILY MEDICINE

## 2020-05-19 PROCEDURE — 80053 COMPREHEN METABOLIC PANEL: CPT | Performed by: FAMILY MEDICINE

## 2020-05-19 PROCEDURE — 80061 LIPID PANEL: CPT | Performed by: FAMILY MEDICINE

## 2020-05-19 PROCEDURE — 83036 HEMOGLOBIN GLYCOSYLATED A1C: CPT | Performed by: FAMILY MEDICINE

## 2020-05-19 PROCEDURE — 99396 PREV VISIT EST AGE 40-64: CPT | Performed by: FAMILY MEDICINE

## 2020-05-19 RX ORDER — METOPROLOL SUCCINATE 50 MG/1
50 TABLET, EXTENDED RELEASE ORAL DAILY
Qty: 90 TABLET | Refills: 1 | Status: SHIPPED | OUTPATIENT
Start: 2020-05-19 | End: 2020-11-05

## 2020-05-19 RX ORDER — METFORMIN HCL 500 MG
2000 TABLET, EXTENDED RELEASE 24 HR ORAL
COMMUNITY
Start: 2020-05-19

## 2020-05-19 RX ORDER — ATORVASTATIN CALCIUM 20 MG/1
20 TABLET, FILM COATED ORAL DAILY
Qty: 90 TABLET | Refills: 1 | Status: SHIPPED | OUTPATIENT
Start: 2020-05-19 | End: 2020-11-05

## 2020-05-19 RX ORDER — VALSARTAN AND HYDROCHLOROTHIAZIDE 160; 12.5 MG/1; MG/1
1 TABLET, FILM COATED ORAL DAILY
Qty: 90 TABLET | Refills: 1 | Status: SHIPPED | OUTPATIENT
Start: 2020-05-19 | End: 2020-11-05

## 2020-05-19 ASSESSMENT — MIFFLIN-ST. JEOR: SCORE: 1193.64

## 2020-05-19 NOTE — PATIENT INSTRUCTIONS
ASA daily recommended  Exercise encouraged  Fasting lipid profile obtained  High fiber, low fat diet encouraged  Refills given  Routine breast self-exam encouraged  Seat belt use encouraged  Sunscreen recommended        (E11.8) Type 2 diabetes mellitus with complication, without long-term current use of insulin (H)  Plan: Hemoglobin A1c (BFP), VENOUS COLLECTION, Lipid         Panel (BFP), Comprehensive Metobolic Panel         (BFP), Albumin Random Urine Quantitative with         Creat Ratio, OFFICE/OUTPT VISIT,EST,LEVL III, C        FOOT EXAM  NO CHARGE, linagliptin-metFORMIN ER         (JENTADUETO XR) 5-1000 MG per tablet, metFORMIN        (GLUCOPHAGE-XR) 500 MG 24 hr tablet,         atorvastatin (LIPITOR) 20 MG tablet,         valsartan-hydrochlorothiazide (DIOVAN HCT)         160-12.5 MG tablet        See below/ back to specialists    (E78.2) Mixed hyperlipidemia  Plan: Lipid Panel (BFP), OFFICE/OUTPT VISIT,EST,LEVL         III, atorvastatin (LIPITOR) 20 MG tablet        1)  Medication: continue current medication regimen unchanged  2)  Low fat, low cholesterol diet  3)  Regular aerobic exercise  4)  Recheck in 6 months, sooner should new symptoms or   problems arise.

## 2020-05-19 NOTE — LETTER
May 21, 2020      Shayla Winter  [address]  [place] [address]        Dear ,    We are writing to inform you of your test results.    Your test results fall within the expected range(s) or remain unchanged from previous results.  Please continue with current treatment plan.    Resulted Orders   Hemoglobin A1c (BFP)   Result Value Ref Range    Hemoglobin A1C 6.9 4.0 - 7.0 %   Lipid Panel (BFP)   Result Value Ref Range    Cholesterol 162 0 - 199 mg/dL    Triglycerides 121 0 - 149 mg/dL    HDL Cholesterol 61 40 - 150 mg/dL    LDL Cholesterol Direct 77 0 - 130 mg/dL    Cholesterol/HDL Ratio 3 0 - 5   Comprehensive Metobolic Panel (BFP)   Result Value Ref Range    Carbon Dioxide 27.4 20 - 32 mmol/L    Creatinine 0.68 (A) 0.70 - 1.18 mg/dL    Glucose 164 (A) 60 - 99 mg/dL    Sodium 135.6 135 - 146 mmol/L    Potassium 3.88 3.5 - 5.3 mmol/L    Chloride 98.2 98 - 110 mmol/L    Protein Total 7.0 6.1 - 8.1 g/dL    Albumin 4.6 3.6 - 5.1 g/dL    Alkaline Phosphatase 62 33 - 130 U/L    ALT 2 0 - 32 U/L    AST 4 0 - 35 U/L    Bilirubin Total 0.8 0.2 - 1.2 mg/dL    Urea Nitrogen 14 7 - 25 mg/dL    Calcium 9.7 8.6 - 10.3 mg/dL    BUN/Creatinine Ratio 20.6 6 - 22    Globulin Calculated 2.4 1.9 - 3.7    A/G Ratio 1.9 1 - 2.5   Albumin Random Urine Quantitative with Creat Ratio   Result Value Ref Range    Albumin Urine mg/spec 10 <30    Albumin Urine mg/g Cr <30 <30 MG/G Creatinine    Creatinine Urine 10 <300   CL AFF HEMOGRAM/PLATE/DIFF (BFP)   Result Value Ref Range    WBC 7.8 4.0 - 11 10*9/L    RBC Count 4.36 3.8 - 5.2 10*12/L    Hemoglobin 14.0 11.7 - 15.7 g/dL    Hematocrit 41.7 35.0 - 47.0 %    MCV 95.7 78 - 100 fL    MCH 32.1 26 - 33 pg    MCHC 33.6 31 - 36 g/dL    Platelet Count 355 150 - 375 10^9/L    % Granulocytes 69.9 %    % Lymphocytes 22.3 %    % Monocytes 7.8 %       If you have any questions or concerns, please call the clinic at the number listed above.       Sincerely,        Latisha Flanagan,  MD

## 2020-05-19 NOTE — PROGRESS NOTES
Here for fasting preventative exam and requests diabetes and hypertension, elevated cholesterol evaluations a refills. She understands this is not a part of preventative PE/ see second note.    1.   SUBJECTIVE:  hSayla Winter is an 64 year old  postmenopausal woman   who presents for annual gyn exam. Menopause at age 50. No   bleeding, spotting, or discharge noted.     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      Relation: Mother          Age of Onset: (Not Specified)          Comment: bypass  Problem: Hypertension      Relation: Father          Age of Onset: (Not Specified)  Problem: Diabetes      Relation: Other          Age of Onset: (Not Specified)          Comment: sister's daughter  Problem: Diabetes Type 2       Relation: Maternal Aunt          Age of Onset: (Not Specified)  Problem: Thyroid Disease      Relation: No family hx of          Age of Onset: (Not Specified)  Problem: Colon Cancer      Relation: No family hx of          Age of Onset: (Not  "Specified)      Past Surgical History:  8/28/2013: ARTHROPLASTY HIP ANTERIOR      Comment:  Procedure: ARTHROPLASTY HIP ANTERIOR;  RIGHT DIRECT                ANTERIOR TOTAL HIP ARTHROPLASTY (DEPUY)^ (HANA TABLE,                C-ARM);  Surgeon: Cricket Castellano MD;  Location:  OR  1980/1983: C C-SEC ONLY,PREV C-SEC  1982: C TREAT ECTOPIC PREG,RMV TUBE/OVARY  7/31/2017: COLONOSCOPY; N/A      Comment:  Procedure: COLONOSCOPY;  Colonoscopy ;  Surgeon: Rachelle Vela MD;  Location:  GI    Current Outpatient Medications:  aspirin 81 MG tablet  atorvastatin (LIPITOR) 20 MG tablet  GABRIELA CONTOUR NEXT test strip  ibuprofen (ADVIL,MOTRIN) 600 MG tablet  linagliptin-metFORMIN ER (JENTADUETO XR) 5-1000 MG per tablet  metFORMIN (GLUCOPHAGE-XR) 500 MG 24 hr tablet  metoprolol succinate ER (TOPROL-XL) 50 MG 24 hr tablet  valsartan-hydrochlorothiazide (DIOVAN HCT) 160-12.5 MG tablet    No current facility-administered medications for this visit.      -- Niacin -- Hives    --  hives,swollen   -- Adhesive Tape -- Rash    Social History    Tobacco Use      Smoking status: Never Smoker      Smokeless tobacco: Never Used    Alcohol use: Yes      Alcohol/week: 0.8 standard drinks      Types: 1 Standard drinks or equivalent per week      Frequency: 2-4 times a month      Drinks per session: 1 or 2      Binge frequency: Never      Comment: once a week      Review Of Systems  Ears/Nose/Throat: hearing loss  Respiratory: No shortness of breath, dyspnea on exertion, cough, or hemoptysis  Cardiovascular: hypertension/ elevated cholesterol  Gastrointestinal: negative  Genitourinary: negative  Skin: hair loss familial    OBJECTIVE:  BP (!) 146/84 (BP Location: Left arm, Patient Position: Sitting, Cuff Size: Adult Large)   Pulse 76   Temp 98.8  F (37.1  C) (Oral)   Ht 1.6 m (5' 3\")   Wt 67 kg (147 lb 9.6 oz)   LMP 09/29/2005   SpO2 99%   BMI 26.15 kg/m    General appearance: healthy, alert, no distress, " cooperative and smiling  Skin: Skin color, texture, turgor normal. No rashes or lesions.  Ears: negative  Nose/Sinuses: Nares normal. Septum midline. Mucosa normal. No drainage or sinus tenderness.  Oropharynx: Lips, mucosa, and tongue normal. Teeth and gums normal.  Neck: Neck supple. No adenopathy. Thyroid symmetric, normal size,, Carotids without bruits.  Lungs: negative, Percussion normal. Good diaphragmatic excursion. Lungs clear  Heart: negative, PMI normal. No lifts, heaves, or thrills. RRR. No murmurs, clicks gallops or rub  Breasts: Inspection negative. No nipple discharge or bleeding. No masses.  Abdomen: Abdomen soft, non-tender. BS normal. No masses, organomegaly  Pelvic: External genitalia and vagina normal. Bimanual and rectovaginal normal., positive findings:  vaginal mucosa atrophy  BMI : Body mass index is 26.15 kg/m .    ASSESSMENT:(Z01.411) Encounter for gynecological examination with abnormal finding  (primary encounter diagnosis)  Plan: CL AFF HEMOGRAM/PLATE/DIFF (BFP)        ASA daily recommended  Exercise encouraged  Fasting lipid profile obtained  High fiber, low fat diet encouraged  Refills given  Routine breast self-exam encouraged  Seat belt use encouraged  Sunscreen recommended        (E11.8) Type 2 diabetes mellitus with complication, without long-term current use of insulin (H)  Plan: Hemoglobin A1c (BFP), VENOUS COLLECTION, Lipid         Panel (BFP), Comprehensive Metobolic Panel         (BFP), Albumin Random Urine Quantitative with         Creat Ratio, OFFICE/OUTPT VISIT,EST,LEVL III, C        FOOT EXAM  NO CHARGE, linagliptin-metFORMIN ER         (JENTADUETO XR) 5-1000 MG per tablet, metFORMIN        (GLUCOPHAGE-XR) 500 MG 24 hr tablet,         atorvastatin (LIPITOR) 20 MG tablet,         valsartan-hydrochlorothiazide (DIOVAN HCT)         160-12.5 MG tablet        See below/ back to specialists    (E78.2) Mixed hyperlipidemia  Plan: Lipid Panel (BFP), OFFICE/OUTPT VISIT,EST,LEVL          III, atorvastatin (LIPITOR) 20 MG tablet        1)  Medication: continue current medication regimen unchanged  2)  Low fat, low cholesterol diet  3)  Regular aerobic exercise  4)  Recheck in 6 months, sooner should new symptoms or   problems arise.    Patient Education: Reviewed risks of elevated lipids and principles   of treatment.        (I10) Essential hypertension, benign  Plan: Comprehensive Metobolic Panel (BFP),         OFFICE/OUTPT VISIT,EST,LEVL III, metoprolol         succinate ER (TOPROL-XL) 50 MG 24 hr tablet,         valsartan-hydrochlorothiazide (DIOVAN HCT)         160-12.5 MG tablet        1)  Medication: continue current medication regimen unchanged  2)  Dietary sodium restriction  3)  Regular aerobic exercise  4)  Recheck in 6 months, sooner should new symptoms or   problems arise.    Patient Education: Reviewed risks of hypertension and principles of   treatment.        (Z79.899) Encounter for long-term (current) use of medications  Plan: Lipid Panel (BFP), Comprehensive Metobolic         Panel (BFP), Albumin Random Urine Quantitative         with Creat Ratio, OFFICE/OUTPT VISIT,EST,LEVL         III                Dx:  1)  Pap smear, mammogram  2)  Lipids at appropriate intervals    PE:  Reviewed health maintenance including diet, regular exercise,   estrogen replacement and periodic exams.    2.   SUBJECTIVE:  Shayla Winter is an 64 year old female who presents for evaluation and treatment   of Type 2 diabetes mellitus followed by Endocrinology. She needs labs to be sent to them and to see me for hyperlipidemai and hypertension.     Age at diagnosis 52. Family history   positive for diabetes in the patient s mother.   Previous treatment modalities employed include diet, oral agents, exercise and ASA.   Current treatment includes diet, oral agents, exercise and ASA.     Current monitoring regimen: home blood tests - rarely  Home blood sugar records: NA  Last HgbA1c: 6.9  Diabetic  "complications: peripheral neuropathy  Cardiovascular risk factors: family history, lipids, diabetes mellitus, hypertension, obesity and stress    Current Outpatient Medications   Medication     aspirin 81 MG tablet     atorvastatin (LIPITOR) 20 MG tablet     GABRIELA CONTOUR NEXT test strip     ibuprofen (ADVIL,MOTRIN) 600 MG tablet     linagliptin-metFORMIN ER (JENTADUETO XR) 5-1000 MG per tablet     metFORMIN (GLUCOPHAGE-XR) 500 MG 24 hr tablet     metoprolol succinate ER (TOPROL-XL) 50 MG 24 hr tablet     valsartan-hydrochlorothiazide (DIOVAN HCT) 160-12.5 MG tablet     No current facility-administered medications for this visit.      Allergies   Allergen Reactions     Niacin Hives     hives,swollen     Adhesive Tape Rash       Social History     Tobacco Use     Smoking status: Never Smoker     Smokeless tobacco: Never Used   Substance Use Topics     Alcohol use: Yes     Alcohol/week: 0.8 standard drinks     Types: 1 Standard drinks or equivalent per week     Frequency: 2-4 times a month     Drinks per session: 1 or 2     Binge frequency: Never     Comment: once a week       Review Of Systems  Skin: hair loss  Eyes: negative  Ears/Nose/Throat: negative  Respiratory: No shortness of breath, dyspnea on exertion, cough, or hemoptysis  Cardiovascular: hypertension and elevated cholesterol  Gastrointestinal: negative  Genitourinary: negative  Musculoskeletal: negative  Neurologic: negative  Psychiatric: negative  Hematologic/Lymphatic/Immunologic: negative  Endocrine: diabetes    OBJECTIVE:  BP (!) 146/84 (BP Location: Left arm, Patient Position: Sitting, Cuff Size: Adult Large)   Pulse 76   Temp 98.8  F (37.1  C) (Oral)   Ht 1.6 m (5' 3\")   Wt 67 kg (147 lb 9.6 oz)   LMP 09/29/2005   SpO2 99%   BMI 26.15 kg/m    General appearance: healthy, alert, no distress, cooperative, smiling and over weight  Skin: Skin color, texture, turgor normal. No rashes or lesions.  Eyes: conjunctivae/corneas clear. PERRL, EOM's " intact. Fundi benign  Ears: negative  Oropharynx: Lips, mucosa, and tongue normal. Teeth and gums normal.  Neck: Neck supple. No adenopathy. Thyroid symmetric, normal size,, Carotids without bruits.  Lungs: negative, Percussion normal. Good diaphragmatic excursion. Lungs clear  Heart: negative, PMI normal. No lifts, heaves, or thrills. RRR. No murmurs, clicks gallops or rub  Abdomen: Abdomen soft, non-tender. BS normal. No masses, organomegaly  Extremities: Extremities normal. No deformities, edema, or skin discoloration.  Peripheral pulses: radial=4/4, femoral=4/4, popliteal=4/4, dorsalis pedis=4/4,  Neuro: Gait normal. Reflexes normal and symmetric. Sensation grossly WNL.

## 2020-05-19 NOTE — NURSING NOTE
Patient is here for a full physical exam.    Pre-Visit Screening :  Immunizations : up to date  Colon Screening : is up to date  Mammogram: up to date  Asthma Action Test/Plan : NA  PHQ9 :  NA  GAD7 :  NA  Patient's  BMI Body mass index is 26.15 kg/m .  Questioned patient about current smoking habits.  Pt. has never smoked.  OK to leave a detailed voice message regarding today's visit Yes, phone # [phone]      ETOH screening: addressed in history

## 2020-09-19 DIAGNOSIS — E11.8 TYPE 2 DIABETES MELLITUS WITH COMPLICATION, WITHOUT LONG-TERM CURRENT USE OF INSULIN (H): ICD-10-CM

## 2020-09-19 DIAGNOSIS — E78.2 MIXED HYPERLIPIDEMIA: ICD-10-CM

## 2020-09-19 DIAGNOSIS — I10 ESSENTIAL HYPERTENSION, BENIGN: ICD-10-CM

## 2020-09-21 RX ORDER — ATORVASTATIN CALCIUM 20 MG/1
TABLET, FILM COATED ORAL
Qty: 90 TABLET | Refills: 3 | COMMUNITY
Start: 2020-09-21

## 2020-09-21 RX ORDER — VALSARTAN AND HYDROCHLOROTHIAZIDE 160; 12.5 MG/1; MG/1
1 TABLET, FILM COATED ORAL DAILY
Qty: 90 TABLET | Refills: 3 | COMMUNITY
Start: 2020-09-21

## 2020-09-21 NOTE — TELEPHONE ENCOUNTER
Pt last seen on 5/19/20 to f/u in 6 months for a FASTING med check.  Can call in an extension of medication once an appointment has been scheduled.        Refused Prescriptions:                       Disp   Refills    valsartan-hydrochlorothiazide (DIOVAN HCT)*90 tab*3        Sig: TAKE 1 TABLET BY MOUTH  DAILY  Refused By: DEE JIMENEZ  Reason for Refusal: Patient needs appointment    atorvastatin (LIPITOR) 20 MG tablet [Pharm*90 tab*3        Sig: TAKE 1 TABLET BY MOUTH  DAILY  Refused By: DEE JIMENEZ  Reason for Refusal: Patient needs appointment

## 2020-10-05 DIAGNOSIS — I10 ESSENTIAL HYPERTENSION, BENIGN: ICD-10-CM

## 2020-10-05 DIAGNOSIS — E78.2 MIXED HYPERLIPIDEMIA: ICD-10-CM

## 2020-10-05 DIAGNOSIS — E11.8 TYPE 2 DIABETES MELLITUS WITH COMPLICATION, WITHOUT LONG-TERM CURRENT USE OF INSULIN (H): ICD-10-CM

## 2020-10-06 RX ORDER — ATORVASTATIN CALCIUM 20 MG/1
TABLET, FILM COATED ORAL
Qty: 90 TABLET | Refills: 3 | COMMUNITY
Start: 2020-10-06

## 2020-10-06 RX ORDER — VALSARTAN AND HYDROCHLOROTHIAZIDE 160; 12.5 MG/1; MG/1
1 TABLET, FILM COATED ORAL DAILY
Qty: 90 TABLET | Refills: 3 | COMMUNITY
Start: 2020-10-06

## 2020-10-06 NOTE — TELEPHONE ENCOUNTER
Shayla Winter is requesting a refill of:    Refused Prescriptions:                       Disp   Refills    valsartan-hydrochlorothiazide (DIOVAN HCT)*90 tab*3        Sig: TAKE 1 TABLET BY MOUTH  DAILY  Refused By: ABDIAZIZ BIGGS  Reason for Refusal: Patient needs appointment    atorvastatin (LIPITOR) 20 MG tablet [Pharm*90 tab*3        Sig: TAKE 1 TABLET BY MOUTH  DAILY  Refused By: ABDIAZIZ BIGGS  Reason for Refusal: Patient needs appointment      Pt due for OV November. Should have enough medication until then.

## 2020-11-03 DIAGNOSIS — I10 ESSENTIAL HYPERTENSION, BENIGN: ICD-10-CM

## 2020-11-04 RX ORDER — METOPROLOL SUCCINATE 50 MG/1
TABLET, EXTENDED RELEASE ORAL
Qty: 90 TABLET | Refills: 3 | COMMUNITY
Start: 2020-11-04

## 2020-11-04 NOTE — TELEPHONE ENCOUNTER
Refused Prescriptions:                       Disp   Refills    metoprolol succinate ER (TOPROL-XL) 50 MG *90 tab*3        Sig: TAKE 1 TABLET BY MOUTH  DAILY  Refused By: DEE JIMENEZ  Reason for Refusal: Patient needs appointment      Pt is coming in tomorrow for an appt and can discuss rx then.

## 2020-11-05 ENCOUNTER — OFFICE VISIT (OUTPATIENT)
Dept: FAMILY MEDICINE | Facility: CLINIC | Age: 65
End: 2020-11-05

## 2020-11-05 VITALS
WEIGHT: 155.2 LBS | HEIGHT: 63 IN | OXYGEN SATURATION: 97 % | SYSTOLIC BLOOD PRESSURE: 134 MMHG | HEART RATE: 72 BPM | BODY MASS INDEX: 27.5 KG/M2 | TEMPERATURE: 98.9 F | DIASTOLIC BLOOD PRESSURE: 78 MMHG | RESPIRATION RATE: 20 BRPM

## 2020-11-05 DIAGNOSIS — I10 ESSENTIAL HYPERTENSION, BENIGN: ICD-10-CM

## 2020-11-05 DIAGNOSIS — Z23 NEED FOR VACCINATION: ICD-10-CM

## 2020-11-05 DIAGNOSIS — E78.2 MIXED HYPERLIPIDEMIA: Primary | ICD-10-CM

## 2020-11-05 DIAGNOSIS — Z79.899 ENCOUNTER FOR LONG-TERM (CURRENT) USE OF MEDICATIONS: ICD-10-CM

## 2020-11-05 DIAGNOSIS — E11.8 TYPE 2 DIABETES MELLITUS WITH COMPLICATION, WITHOUT LONG-TERM CURRENT USE OF INSULIN (H): ICD-10-CM

## 2020-11-05 LAB
ALBUMIN SERPL-MCNC: 4.8 G/DL (ref 3.6–5.1)
ALBUMIN/GLOB SERPL: 1.8 {RATIO} (ref 1–2.5)
ALP SERPL-CCNC: 65 U/L (ref 33–130)
ALT 1742-6: 7 U/L (ref 0–32)
AST 1920-8: 7 U/L (ref 0–35)
BILIRUB SERPL-MCNC: 0.7 MG/DL (ref 0.2–1.2)
BUN SERPL-MCNC: 15 MG/DL (ref 7–25)
BUN/CREATININE RATIO: 20.8 (ref 6–22)
CALCIUM SERPL-MCNC: 10.2 MG/DL (ref 8.6–10.3)
CHLORIDE SERPLBLD-SCNC: 101 MMOL/L (ref 98–110)
CHOLEST SERPL-MCNC: 167 MG/DL (ref 0–199)
CHOLEST/HDLC SERPL: 3 {RATIO} (ref 0–5)
CO2 SERPL-SCNC: 27.7 MMOL/L (ref 20–32)
CREAT SERPL-MCNC: 0.72 MG/DL (ref 0.7–1.18)
GLOBULIN, CALCULATED - QUEST: 2.7 (ref 1.9–3.7)
GLUCOSE SERPL-MCNC: 179 MG/DL (ref 60–99)
HDLC SERPL-MCNC: 62 MG/DL (ref 40–150)
LDLC SERPL CALC-MCNC: 80 MG/DL (ref 0–130)
POTASSIUM SERPL-SCNC: 4.44 MMOL/L (ref 3.5–5.3)
PROT SERPL-MCNC: 7.5 G/DL (ref 6.1–8.1)
SODIUM SERPL-SCNC: 138.8 MMOL/L (ref 135–146)
TRIGL SERPL-MCNC: 127 MG/DL (ref 0–149)

## 2020-11-05 PROCEDURE — 80061 LIPID PANEL: CPT | Performed by: FAMILY MEDICINE

## 2020-11-05 PROCEDURE — 36415 COLL VENOUS BLD VENIPUNCTURE: CPT | Performed by: FAMILY MEDICINE

## 2020-11-05 PROCEDURE — 99213 OFFICE O/P EST LOW 20 MIN: CPT | Mod: 25 | Performed by: FAMILY MEDICINE

## 2020-11-05 PROCEDURE — 80053 COMPREHEN METABOLIC PANEL: CPT | Performed by: FAMILY MEDICINE

## 2020-11-05 PROCEDURE — 90714 TD VACC NO PRESV 7 YRS+ IM: CPT | Performed by: FAMILY MEDICINE

## 2020-11-05 PROCEDURE — 90471 IMMUNIZATION ADMIN: CPT | Performed by: FAMILY MEDICINE

## 2020-11-05 RX ORDER — ATORVASTATIN CALCIUM 20 MG/1
20 TABLET, FILM COATED ORAL DAILY
Qty: 90 TABLET | Refills: 1 | Status: SHIPPED | OUTPATIENT
Start: 2020-11-05 | End: 2021-05-12

## 2020-11-05 RX ORDER — METOPROLOL SUCCINATE 50 MG/1
50 TABLET, EXTENDED RELEASE ORAL DAILY
Qty: 90 TABLET | Refills: 1 | Status: SHIPPED | OUTPATIENT
Start: 2020-11-05 | End: 2021-05-12

## 2020-11-05 RX ORDER — VALSARTAN AND HYDROCHLOROTHIAZIDE 160; 12.5 MG/1; MG/1
1 TABLET, FILM COATED ORAL DAILY
Qty: 90 TABLET | Refills: 1 | Status: SHIPPED | OUTPATIENT
Start: 2020-11-05 | End: 2021-05-12

## 2020-11-05 ASSESSMENT — MIFFLIN-ST. JEOR: SCORE: 1223.11

## 2020-11-05 NOTE — PROGRESS NOTES
SUBJECTIVE:  Shayla Winter is an 65 year old female who presents for evaluation of hyperlipidemia and hypertension/ diabetes followed by diabetes/ working on weight loss.     She has been diagnosed in the past as having   combined hyperlipidemia. She indicates that she is feeling well   and denies any symptoms of cardiovascular disease. Specifically   denies chest pain, palpitations, dyspnea, orthopnea, PND,   claudication or peripheral edema. Treatment modalities employed to   this point include diet, regular aerobic exercise, weight loss and Lipitor. Current medication   regimen is as listed below. Patient denies any side effects of   medication.    Family history: positive for hypertension, cardiovascular disease and elevated cholesterol  Age at diagnosis of hyperlipidemia: 42 and hypertension age 41  Cardiovascular risk factors: family history, lipids, diabetes mellitus, hypertension and stress    Current Outpatient Medications   Medication     aspirin 81 MG tablet     atorvastatin (LIPITOR) 20 MG tablet     GABRIELA CONTOUR NEXT test strip     ibuprofen (ADVIL,MOTRIN) 600 MG tablet     linagliptin-metFORMIN ER (JENTADUETO XR) 5-1000 MG per tablet     metFORMIN (GLUCOPHAGE-XR) 500 MG 24 hr tablet     metoprolol succinate ER (TOPROL-XL) 50 MG 24 hr tablet     valsartan-hydrochlorothiazide (DIOVAN HCT) 160-12.5 MG tablet     No current facility-administered medications for this visit.      Allergies   Allergen Reactions     Niacin Hives     hives,swollen     Adhesive Tape Rash       Social History     Tobacco Use     Smoking status: Never Smoker     Smokeless tobacco: Never Used   Substance Use Topics     Alcohol use: Yes     Alcohol/week: 0.8 standard drinks     Types: 1 Standard drinks or equivalent per week     Frequency: 2-4 times a month     Drinks per session: 1 or 2     Binge frequency: Never     Comment: once a week       OBJECTIVE:  /78 (BP Location: Left arm, Patient Position: Sitting, Cuff Size:  "Adult Large)   Pulse 72   Temp 98.9  F (37.2  C) (Oral)   Resp 20   Ht 1.6 m (5' 3\")   Wt 70.4 kg (155 lb 3.2 oz)   LMP 09/29/2005   SpO2 97%   BMI 27.49 kg/m    Repeat BP R arm seated = 134/78 with large size cuff.  Skin: negative  Fundi: deferred  Lungs: negative, Percussion normal. Good diaphragmatic excursion. Lungs clear  Heart: negative, PMI normal. No lifts, heaves, or thrills. RRR. No murmurs, clicks gallops or rub  Peripheral pulses: radial=4/4, femoral=4/4, popliteal=4/4, dorsalis pedis=4/4,  Abd; The abdomen is soft without tenderness, guarding, mass or organomegaly. Bowel sounds are normal. No CVA tenderness or inguinal adenopathy noted.  Ext: The lower extremities are normal and reveal no sign of DVT. Calves and thighs are soft and non tender, color is normal, no swelling or redness. Paty's sign is negative.  Pedal pulses are normal.  BMI : Body mass index is 27.49 kg/m .    ASSESSMENT:(E78.2) Mixed hyperlipidemia  (primary encounter diagnosis)  Plan: Lipid Panel (BFP), VENOUS COLLECTION,         atorvastatin (LIPITOR) 20 MG tablet        1)  Medication: continue current medication regimen unchanged  2)  Low fat, low cholesterol diet  3)  Regular aerobic exercise  4)  Recheck in 6 months, sooner should new symptoms or   problems arise.    Patient Education: Reviewed risks of elevated lipids and principles   of treatment.        (I10) Essential hypertension, benign  Plan: Comprehensive Metobolic Panel (BFP), VENOUS         COLLECTION, metoprolol succinate ER (TOPROL-XL)        50 MG 24 hr tablet,         valsartan-hydrochlorothiazide (DIOVAN HCT)         160-12.5 MG tablet        1)  Medication: continue current medication regimen unchanged  2)  Dietary sodium restriction  3)  Regular aerobic exercise  4)  Recheck in 6 months, sooner should new symptoms or   problems arise.    Patient Education: Reviewed risks of hypertension and principles of   treatment.          (Z79.899) Encounter for " long-term (current) use of medications  Plan: Lipid Panel (BFP), Comprehensive Metobolic         Panel (BFP), VENOUS COLLECTION            (E11.8) Type 2 diabetes mellitus with complication, without long-term current use of insulin (H)  Plan: atorvastatin (LIPITOR) 20 MG tablet,         valsartan-hydrochlorothiazide (DIOVAN HCT)         160-12.5 MG tablet        I have reviewed the patient's medical history in detail and updated the computerized patient record.      (Z23) Need for vaccination  Plan: TD, PRESERV FREE, 7+

## 2020-11-05 NOTE — NURSING NOTE
Shayla is here today for a fasting med recheck.    Pre-visit Screening:  Immunizations:  up to date  Colonoscopy:  is up to date  Mammogram: is up to date  Asthma Action Test/Plan:  NA  PHQ9:  NA  GAD7:  NA  Questioned patient about current smoking habits Pt. has never smoked.  Ok to leave detailed message on voice mail for today's visit only Yes, phone # [phone]

## 2020-11-05 NOTE — LETTER
November 5, 2020      Shayla Winter  [address]  [place] [address]        Dear ,    We are writing to inform you of your test results.    Your test results fall within the expected range(s) or remain unchanged from previous results.  Please continue with current treatment plan.    Resulted Orders   Lipid Panel (BFP)   Result Value Ref Range    Cholesterol 167 0 - 199 mg/dL    Triglycerides 127 0 - 149 mg/dL    HDL Cholesterol 62 40 - 150 mg/dL    LDL Cholesterol Direct 80 0 - 130 mg/dL    Cholesterol/HDL Ratio 3 0 - 5   Comprehensive Metobolic Panel (BFP)   Result Value Ref Range    Carbon Dioxide 27.7 20 - 32 mmol/L    Creatinine 0.72 0.70 - 1.18 mg/dL    Glucose 179 (A) 60 - 99 mg/dL    Sodium 138.8 135 - 146 mmol/L    Potassium 4.44 3.5 - 5.3 mmol/L    Chloride 101.0 98 - 110 mmol/L    Protein Total 7.5 6.1 - 8.1 g/dL    Albumin 4.8 3.6 - 5.1 g/dL    Alkaline Phosphatase 65 33 - 130 U/L    ALT 7 0 - 32 U/L    AST 7 0 - 35 U/L    Bilirubin Total 0.7 0.2 - 1.2 mg/dL    Urea Nitrogen 15 7 - 25 mg/dL    Calcium 10.2 8.6 - 10.3 mg/dL    BUN/Creatinine Ratio 20.8 6 - 22    Globulin Calculated 2.7 1.9 - 3.7    A/G Ratio 1.8 1 - 2.5       If you have any questions or concerns, please call the clinic at the number listed above.       Sincerely,        Latisha Flanagan MD

## 2021-03-19 DIAGNOSIS — I10 ESSENTIAL HYPERTENSION, BENIGN: ICD-10-CM

## 2021-03-20 DIAGNOSIS — E11.8 TYPE 2 DIABETES MELLITUS WITH COMPLICATION, WITHOUT LONG-TERM CURRENT USE OF INSULIN (H): ICD-10-CM

## 2021-03-20 DIAGNOSIS — I10 ESSENTIAL HYPERTENSION, BENIGN: ICD-10-CM

## 2021-03-20 DIAGNOSIS — E78.2 MIXED HYPERLIPIDEMIA: ICD-10-CM

## 2021-03-22 RX ORDER — METOPROLOL SUCCINATE 50 MG/1
TABLET, EXTENDED RELEASE ORAL
Qty: 90 TABLET | Refills: 3 | COMMUNITY
Start: 2021-03-22

## 2021-03-22 RX ORDER — VALSARTAN AND HYDROCHLOROTHIAZIDE 160; 12.5 MG/1; MG/1
1 TABLET, FILM COATED ORAL DAILY
Qty: 90 TABLET | Refills: 3 | COMMUNITY
Start: 2021-03-22

## 2021-03-22 RX ORDER — ATORVASTATIN CALCIUM 20 MG/1
TABLET, FILM COATED ORAL
Qty: 90 TABLET | Refills: 3 | COMMUNITY
Start: 2021-03-22

## 2021-03-22 NOTE — TELEPHONE ENCOUNTER
Received incoming refill request for  Pending Prescriptions:                       Disp   Refills    valsartan-hydrochlorothiazide (DIOVAN HCT*90 tab*3            Sig: TAKE 1 TABLET BY MOUTH  DAILY    atorvastatin (LIPITOR) 20 MG tablet [Phar*90 tab*3            Sig: TAKE 1 TABLET BY MOUTH  DAILY    Patient last had a refill of these medications on 11/5/20 for a 6 month supply so patient should have enough to get her until May.

## 2021-03-25 ENCOUNTER — TELEPHONE (OUTPATIENT)
Dept: FAMILY MEDICINE | Facility: CLINIC | Age: 66
End: 2021-03-25

## 2021-03-26 ENCOUNTER — TRANSFERRED RECORDS (OUTPATIENT)
Dept: FAMILY MEDICINE | Facility: CLINIC | Age: 66
End: 2021-03-26

## 2021-03-29 ENCOUNTER — TRANSFERRED RECORDS (OUTPATIENT)
Dept: FAMILY MEDICINE | Facility: CLINIC | Age: 66
End: 2021-03-29

## 2021-04-04 DIAGNOSIS — I10 ESSENTIAL HYPERTENSION, BENIGN: ICD-10-CM

## 2021-04-05 RX ORDER — METOPROLOL SUCCINATE 50 MG/1
TABLET, EXTENDED RELEASE ORAL
Qty: 90 TABLET | Refills: 3 | COMMUNITY
Start: 2021-04-05

## 2021-04-05 NOTE — TELEPHONE ENCOUNTER
Received incoming refill request for  Pending Prescriptions:                       Disp   Refills    metoprolol succinate ER (TOPROL-XL) 50 MG*90 tab*3            Sig: TAKE 1 TABLET BY MOUTH  DAILY    Patient last had a refill of this medication on 11/5/2020 for a 6 month supply so no refill should be needed until May.

## 2021-04-28 NOTE — TELEPHONE ENCOUNTER
Called patient to see if she is going to be still seeing Endocrinology for her diabetes care. Patient states that she has not seen them in awhile but is going to be making an appointment to see them in June. She is going to be coming in to see Dr. Flanagan in May where we will get her a1c updated so that she is abl to bring these results to her endo appt. I asked her about an eye exam and she does have one scheduled for 6/11/21.

## 2021-05-12 ENCOUNTER — OFFICE VISIT (OUTPATIENT)
Dept: FAMILY MEDICINE | Facility: CLINIC | Age: 66
End: 2021-05-12

## 2021-05-12 VITALS
RESPIRATION RATE: 20 BRPM | DIASTOLIC BLOOD PRESSURE: 86 MMHG | TEMPERATURE: 97.8 F | WEIGHT: 156 LBS | OXYGEN SATURATION: 99 % | BODY MASS INDEX: 27.64 KG/M2 | SYSTOLIC BLOOD PRESSURE: 134 MMHG | HEIGHT: 63 IN | HEART RATE: 76 BPM

## 2021-05-12 DIAGNOSIS — Z01.411 ENCOUNTER FOR GYNECOLOGICAL EXAMINATION WITH ABNORMAL FINDING: Primary | ICD-10-CM

## 2021-05-12 DIAGNOSIS — E11.8 TYPE 2 DIABETES MELLITUS WITH COMPLICATION, WITHOUT LONG-TERM CURRENT USE OF INSULIN (H): ICD-10-CM

## 2021-05-12 DIAGNOSIS — E78.2 MIXED HYPERLIPIDEMIA: ICD-10-CM

## 2021-05-12 DIAGNOSIS — Z79.899 ENCOUNTER FOR LONG-TERM (CURRENT) USE OF MEDICATIONS: ICD-10-CM

## 2021-05-12 DIAGNOSIS — I10 ESSENTIAL HYPERTENSION, BENIGN: ICD-10-CM

## 2021-05-12 DIAGNOSIS — Z12.4 SCREENING FOR CERVICAL CANCER: ICD-10-CM

## 2021-05-12 LAB
ALBUMIN SERPL-MCNC: 4.6 G/DL (ref 3.6–5.1)
ALBUMIN URINE MG/G CR: <30 MG/G CREATININE
ALBUMIN URINE MG/SPEC: 10
ALBUMIN/GLOB SERPL: 1.8 {RATIO} (ref 1–2.5)
ALP SERPL-CCNC: 71 U/L (ref 33–130)
ALT 1742-6: 15 U/L (ref 0–32)
AST 1920-8: 8 U/L (ref 0–35)
BILIRUB SERPL-MCNC: 0.8 MG/DL (ref 0.2–1.2)
BUN SERPL-MCNC: 15 MG/DL (ref 7–25)
BUN/CREATININE RATIO: 20 (ref 6–22)
CALCIUM SERPL-MCNC: 10 MG/DL (ref 8.6–10.3)
CHLORIDE SERPLBLD-SCNC: 100.1 MMOL/L (ref 98–110)
CHOLEST SERPL-MCNC: 167 MG/DL (ref 0–199)
CHOLEST/HDLC SERPL: 3 {RATIO} (ref 0–5)
CO2 SERPL-SCNC: 28.3 MMOL/L (ref 20–32)
CREAT SERPL-MCNC: 0.75 MG/DL (ref 0.6–1.3)
CREATININE URINE: 10
GLOBULIN, CALCULATED - QUEST: 2.6 (ref 1.9–3.7)
GLUCOSE SERPL-MCNC: 176 MG/DL (ref 60–99)
HBA1C MFR BLD: 7.5 % (ref 4–7)
HDLC SERPL-MCNC: 60 MG/DL (ref 40–150)
HEMOGLOBIN: 14.1 G/DL (ref 11.7–15.7)
LDLC SERPL CALC-MCNC: 83 MG/DL (ref 0–130)
POTASSIUM SERPL-SCNC: 4.4 MMOL/L (ref 3.5–5.3)
PROT SERPL-MCNC: 7.2 G/DL (ref 6.1–8.1)
SODIUM SERPL-SCNC: 137.6 MMOL/L (ref 135–146)
TRIGL SERPL-MCNC: 120 MG/DL (ref 0–149)

## 2021-05-12 PROCEDURE — 80061 LIPID PANEL: CPT | Performed by: FAMILY MEDICINE

## 2021-05-12 PROCEDURE — 85018 HEMOGLOBIN: CPT | Performed by: FAMILY MEDICINE

## 2021-05-12 PROCEDURE — 83036 HEMOGLOBIN GLYCOSYLATED A1C: CPT | Performed by: FAMILY MEDICINE

## 2021-05-12 PROCEDURE — 99212 OFFICE O/P EST SF 10 MIN: CPT | Mod: 25 | Performed by: FAMILY MEDICINE

## 2021-05-12 PROCEDURE — 80053 COMPREHEN METABOLIC PANEL: CPT | Performed by: FAMILY MEDICINE

## 2021-05-12 PROCEDURE — 82043 UR ALBUMIN QUANTITATIVE: CPT | Performed by: FAMILY MEDICINE

## 2021-05-12 PROCEDURE — 36415 COLL VENOUS BLD VENIPUNCTURE: CPT | Performed by: FAMILY MEDICINE

## 2021-05-12 PROCEDURE — 99396 PREV VISIT EST AGE 40-64: CPT | Performed by: FAMILY MEDICINE

## 2021-05-12 RX ORDER — VALSARTAN AND HYDROCHLOROTHIAZIDE 160; 12.5 MG/1; MG/1
1 TABLET, FILM COATED ORAL DAILY
Qty: 90 TABLET | Refills: 1 | Status: SHIPPED | OUTPATIENT
Start: 2021-05-12 | End: 2021-06-11

## 2021-05-12 RX ORDER — METOPROLOL SUCCINATE 50 MG/1
50 TABLET, EXTENDED RELEASE ORAL DAILY
Qty: 90 TABLET | Refills: 1 | Status: SHIPPED | OUTPATIENT
Start: 2021-05-12 | End: 2021-06-11

## 2021-05-12 RX ORDER — ATORVASTATIN CALCIUM 20 MG/1
20 TABLET, FILM COATED ORAL DAILY
Qty: 90 TABLET | Refills: 1 | Status: SHIPPED | OUTPATIENT
Start: 2021-05-12 | End: 2021-06-11

## 2021-05-12 ASSESSMENT — MIFFLIN-ST. JEOR: SCORE: 1218.8

## 2021-05-12 NOTE — LETTER
May 19, 2021      Shayla Winter  [address]  [place] [address]        Dear ,    We are writing to inform you of your test results.    Normal thin prep (pap smear)  Need for better diabetes control as discussed/ see next page.        If you have any questions or concerns, please call the clinic at the number listed above.       Sincerely,      Latisha Flanagan MD

## 2021-05-12 NOTE — NURSING NOTE
Chief Complaint   Patient presents with     Physical     annual, fasting, needs to have diabetic check done, could not get into endo until later in June     Pre-visit Screening:  Immunizations:  up to date  Colonoscopy:  is up to date  Mammogram: is up to date  Asthma Action Test/Plan:  NA  PHQ9:  PHQ-2 done today   GAD7:  No concerns  Questioned patient about current smoking habits Pt. has never smoked.  Ok to leave detailed message on voice mail for today's visit only Yes, phone # [phone]

## 2021-05-12 NOTE — PATIENT INSTRUCTIONS
Back to Endocrinology    1)  Medication: continue current medication regimen unchanged  2)  Dietary sodium restriction  3)  Regular aerobic exercise  4)  Recheck in 6 months, sooner should new symptoms or   problems arise.    Patient Education: Reviewed risks of hypertension/hyperlipidemia and principles of   treatment.      ASA daily recommended  Comprehensive profile obtained  Exercise encouraged  Fasting lipid profile obtained  Hemoglobin obtained  High fiber, low fat diet encouraged  PAP smear obtained  Refills given  Routine breast self-exam encouraged  Seat belt use encouraged  Sunscreen recommended  Weight loss recommended

## 2021-05-12 NOTE — PROGRESS NOTES
Multiple issues    1. Preventative exam  2. Type 2 diabetes, endocrinology visit   3. Hypertension  4. Hyperlipidemia    1. SUBJECTIVE:  Shayla Winter is an 65 year old  postmenopausal woman   who presents for annual gyn exam. Menopause at age 50. No   bleeding, spotting, or discharge noted.     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      Relation: Mother          Age of Onset: (Not Specified)          Comment: bypass  Problem: Hypertension      Relation: Father          Age of Onset: (Not Specified)  Problem: Diabetes      Relation: Other          Age of Onset: (Not Specified)          Comment: sister's daughter  Problem: Diabetes Type 2       Relation: Maternal Aunt          Age of Onset: (Not Specified)  Problem: Thyroid Disease      Relation: No family hx of          Age of Onset: (Not Specified)  Problem: Colon Cancer      Relation: No  "family hx of          Age of Onset: (Not Specified)      Past Surgical History:  8/28/2013: ARTHROPLASTY HIP ANTERIOR      Comment:  Procedure: ARTHROPLASTY HIP ANTERIOR;  RIGHT DIRECT                ANTERIOR TOTAL HIP ARTHROPLASTY (DEPUY)^ (HANA TABLE,                C-ARM);  Surgeon: Cricket Castellano MD;  Location:  OR  1980/1983: C C-SEC ONLY,PREV C-SEC  1982: C TREAT ECTOPIC PREG,RMV TUBE/OVARY  7/31/2017: COLONOSCOPY; N/A      Comment:  Procedure: COLONOSCOPY;  Colonoscopy ;  Surgeon: Rachelle Vela MD;  Location:  GI    Current Outpatient Medications:  aspirin 81 MG tablet  atorvastatin (LIPITOR) 20 MG tablet  GABRIELA CONTOUR NEXT test strip  linagliptin-metFORMIN ER (JENTADUETO XR) 5-1000 MG per tablet  metFORMIN (GLUCOPHAGE-XR) 500 MG 24 hr tablet  metoprolol succinate ER (TOPROL-XL) 50 MG 24 hr tablet  valsartan-hydrochlorothiazide (DIOVAN HCT) 160-12.5 MG tablet    No current facility-administered medications for this visit.      -- Niacin -- Hives    --  hives,swollen   -- Adhesive Tape -- Rash    Social History    Tobacco Use      Smoking status: Never Smoker      Smokeless tobacco: Never Used    Alcohol use: Yes      Alcohol/week: 0.8 standard drinks      Types: 1 Standard drinks or equivalent per week      Frequency: 2-4 times a month      Drinks per session: 1 or 2      Binge frequency: Never      Comment: once a week      Review Of Systems  Ears/Nose/Throat: hearing loss  Respiratory: No shortness of breath, dyspnea on exertion, cough, or hemoptysis  Cardiovascular: hypertension/elevated cholesterol  Gastrointestinal: negative  Genitourinary: negative    OBJECTIVE:  BP (!) 140/88/repeat 134/86 (BP Location: Right arm, Patient Position: Sitting, Cuff Size: Adult Large)   Pulse 76   Temp 97.8  F (36.6  C) (Temporal)   Resp 20   Ht 1.588 m (5' 2.5\")   Wt 70.8 kg (156 lb)   LMP 09/29/2005   SpO2 99%   BMI 28.08 kg/m    General appearance: healthy, alert, no distress, " cooperative, smiling and over weight  Skin: Skin color, texture, turgor normal. No rashes or lesions.  Ears: negative  Nose/Sinuses: Nares normal. Septum midline. Mucosa normal. No drainage or sinus tenderness.  Oropharynx: Lips, mucosa, and tongue normal. Teeth and gums normal.  Neck: Neck supple. No adenopathy. Thyroid symmetric, normal size,, Carotids without bruits.  Lungs: negative, Percussion normal. Good diaphragmatic excursion. Lungs clear  Heart: negative, PMI normal. No lifts, heaves, or thrills. RRR. No murmurs, clicks gallops or rub  Breasts: Inspection negative. No nipple discharge or bleeding. No masses.  Abdomen: Abdomen soft, non-tender. BS normal. No masses, organomegaly, positive findings: obese  Pelvic: External genitalia and vagina normal. Bimanual and rectovaginal normal., positive findings:  vaginal mucosa atrophy  BMI : Body mass index is 28.08 kg/m .    ASSESSMENT:(Z01.411) Encounter for gynecological examination with abnormal finding  (primary encounter diagnosis  Plan: VENOUS COLLECTION, HEMOGLOBIN (BFP)        ASA daily recommended  Comprehensive profile obtained  Exercise encouraged  Fasting lipid profile obtained  Hemoglobin obtained  High fiber, low fat diet encouraged  PAP smear obtained  Refills given  Routine breast self-exam encouraged  Seat belt use encouraged  Sunscreen recommended  Weight loss recommended      (E11.8) Type 2 diabetes mellitus with complication, without long-term current use of insulin (H)  Comment: see below  Plan: Hemoglobin A1c (BFP), VENOUS COLLECTION, FOOT         EXAM  NO CHARGE [52138.114], Albumin Random         Urine Quantitative with Creat Ratio, Lipid         Panel (BFP), Comprehensive Metobolic Panel         (BFP), valsartan-hydrochlorothiazide (DIOVAN         HCT) 160-12.5 MG tablet, atorvastatin (LIPITOR)        20 MG tablet        Back to Endocrinology      (E78.2) Mixed hyperlipidemia  Comment: await labs  Plan: VENOUS COLLECTION, Lipid Panel  (BFP),         atorvastatin (LIPITOR) 20 MG tablet        1)  Medication: continue current medication regimen unchanged  2)  Low fat, low cholesterol diet  3)  Regular aerobic exercise  4)  Recheck in 6 months, sooner should new symptoms or   problems arise.    Patient Education: Reviewed risks of elevated lipids and principles   of treatment.        (I10) Essential hypertension, benign  Plan: VENOUS COLLECTION, Comprehensive Metobolic         Panel (BFP), valsartan-hydrochlorothiazide         (DIOVAN HCT) 160-12.5 MG tablet, metoprolol         succinate ER (TOPROL-XL) 50 MG 24 hr tablet        1)  Medication: continue current medication regimen unchanged  2)  Dietary sodium restriction  3)  Regular aerobic exercise  4)  Recheck in 6 months, sooner should new symptoms or   problems arise.    Patient Education: Reviewed risks of hypertension and principles of   treatment.        (Z12.4) Screening for cervical cancer  Plan: ThinPrep Pap and HPV (mRNa E6/E7) ([facility]), CANCELED: ThinPrep Pap and HPV         (mRNa E6/E7) ([facility])            (Z79.899) Encounter for long-term (current) use of medications  Plan: Hemoglobin A1c (BFP), VENOUS COLLECTION,         Albumin Random Urine Quantitative with Creat         Ratio, HEMOGLOBIN (BFP), Lipid Panel (BFP),         Comprehensive Metobolic Panel (BFP)                    2.   SUBJECTIVE:  Shayla Winter is an 65 year old female who presents for evaluation and treatment   of Type 2 diabetes mellitus, hypertension and elevated cholesterol/ followed by DR Goins, Endocrinology. Age at diagnosis 52. Family history positive for diabetes in the patient s mother.   Previous treatment modalities employed include diet, oral agents, exercise and ASA.   Current treatment includes diet, oral agents, exercise and ASA.     Current monitoring regimen: home blood tests - none  Home blood sugar records: refusing  Last HgbA1c: 7.5  Diabetic complications: peripheral neuropathy and  "cardiovascular disease  Cardiovascular risk factors: family history, lipids, diabetes mellitus, hypertension, obesity and sedentary life style    Current Outpatient Medications   Medication     aspirin 81 MG tablet     atorvastatin (LIPITOR) 20 MG tablet     GABRIELA CONTOUR NEXT test strip     linagliptin-metFORMIN ER (JENTADUETO XR) 5-1000 MG per tablet     metFORMIN (GLUCOPHAGE-XR) 500 MG 24 hr tablet     metoprolol succinate ER (TOPROL-XL) 50 MG 24 hr tablet     valsartan-hydrochlorothiazide (DIOVAN HCT) 160-12.5 MG tablet     No current facility-administered medications for this visit.      Allergies   Allergen Reactions     Niacin Hives     hives,swollen     Adhesive Tape Rash       Social History     Tobacco Use     Smoking status: Never Smoker     Smokeless tobacco: Never Used   Substance Use Topics     Alcohol use: Yes     Alcohol/week: 0.8 standard drinks     Types: 1 Standard drinks or equivalent per week     Frequency: 2-4 times a month     Drinks per session: 1 or 2     Binge frequency: Never     Comment: once a week       Review Of Systems  Skin: negative  Eyes: negative  Ears/Nose/Throat: hearing loss  Respiratory: No shortness of breath, dyspnea on exertion, cough, or hemoptysis  Cardiovascular: hypertension/elevated cholesterol  Gastrointestinal: negative  Genitourinary: negative  Musculoskeletal: negative  Neurologic: negative  Psychiatric: negative  Hematologic/Lymphatic/Immunologic: negative  Endocrine: diabetes    OBJECTIVE:  /86 (BP Location: Right arm, Patient Position: Sitting, Cuff Size: Adult Large)   Pulse 76   Temp 97.8  F (36.6  C) (Temporal)   Resp 20   Ht 1.588 m (5' 2.5\")   Wt 70.8 kg (156 lb)   LMP 09/29/2005   SpO2 99%   BMI 28.08 kg/m    General appearance: healthy, alert, no distress, cooperative, smiling and over weight  Skin: Skin color, texture, turgor normal. No rashes or lesions.  Eyes: conjunctivae/corneas clear. PERRL, EOM's intact. Fundi benign  Ears: " negative  Oropharynx: Lips, mucosa, and tongue normal. Teeth and gums normal.  Neck: Neck supple. No adenopathy. Thyroid symmetric, normal size,, Carotids without bruits.  Lungs: negative, Percussion normal. Good diaphragmatic excursion. Lungs clear  Heart: negative, PMI normal. No lifts, heaves, or thrills. RRR. No murmurs, clicks gallops or rub  Abdomen: Abdomen soft, non-tender. BS normal. No masses, organomegaly  Extremities: Extremities normal. No deformities, edema, or skin discoloration.  Peripheral pulses: radial=4/4, femoral=4/4, popliteal=4/4, dorsalis pedis=4/4,  Neuro: Gait normal. Reflexes normal and symmetric. Sensation grossly WNL.

## 2021-05-14 LAB
CLINICAL HISTORY - QUEST: NORMAL
COMMENT - QUEST: NORMAL
CYTOTECHNOLOGIST - QUEST: NORMAL
DESCRIPTIVE DIAGNOSIS - QUEST: NORMAL
HPV MRNA E6/E7: NOT DETECTED
LAST PAP DX - QUEST: NORMAL
LMP - QUEST: NORMAL
PREV BX DX - QUEST: NORMAL
SOURCE: NORMAL
STATEMENT OF ADEQUACY - QUEST: NORMAL

## 2021-06-11 DIAGNOSIS — E78.2 MIXED HYPERLIPIDEMIA: ICD-10-CM

## 2021-06-11 DIAGNOSIS — E11.8 TYPE 2 DIABETES MELLITUS WITH COMPLICATION, WITHOUT LONG-TERM CURRENT USE OF INSULIN (H): ICD-10-CM

## 2021-06-11 DIAGNOSIS — I10 ESSENTIAL HYPERTENSION, BENIGN: ICD-10-CM

## 2021-06-11 RX ORDER — ATORVASTATIN CALCIUM 20 MG/1
20 TABLET, FILM COATED ORAL DAILY
Qty: 90 TABLET | Refills: 0 | Status: SHIPPED | OUTPATIENT
Start: 2021-06-11 | End: 2021-11-04

## 2021-06-11 RX ORDER — METOPROLOL SUCCINATE 50 MG/1
50 TABLET, EXTENDED RELEASE ORAL DAILY
Qty: 90 TABLET | Refills: 0 | Status: SHIPPED | OUTPATIENT
Start: 2021-06-11 | End: 2021-11-04

## 2021-06-11 RX ORDER — VALSARTAN AND HYDROCHLOROTHIAZIDE 160; 12.5 MG/1; MG/1
1 TABLET, FILM COATED ORAL DAILY
Qty: 90 TABLET | Refills: 0 | Status: SHIPPED | OUTPATIENT
Start: 2021-06-11 | End: 2021-11-04

## 2021-06-11 NOTE — TELEPHONE ENCOUNTER
Patient called in stating that she is now on Medicare insurance and can no longer use [facility]. [facility] uses [facility] so she will need her refills that she still has on file for a 90 day supply of each to be sent to [facility] now so they can start getting them processed in their system and are ready to send out once her refills are due.  Routing to Dr. Flanagan to send this in for the patientKyaw Mcguire can be reached at [phone]

## 2021-11-04 ENCOUNTER — OFFICE VISIT (OUTPATIENT)
Dept: FAMILY MEDICINE | Facility: CLINIC | Age: 66
End: 2021-11-04

## 2021-11-04 VITALS
HEIGHT: 63 IN | OXYGEN SATURATION: 98 % | WEIGHT: 151.8 LBS | SYSTOLIC BLOOD PRESSURE: 128 MMHG | DIASTOLIC BLOOD PRESSURE: 84 MMHG | HEART RATE: 79 BPM | TEMPERATURE: 97.6 F | RESPIRATION RATE: 20 BRPM | BODY MASS INDEX: 26.9 KG/M2

## 2021-11-04 DIAGNOSIS — I10 ESSENTIAL HYPERTENSION, BENIGN: ICD-10-CM

## 2021-11-04 DIAGNOSIS — Z79.899 ENCOUNTER FOR LONG-TERM (CURRENT) USE OF MEDICATIONS: ICD-10-CM

## 2021-11-04 DIAGNOSIS — E78.2 MIXED HYPERLIPIDEMIA: ICD-10-CM

## 2021-11-04 DIAGNOSIS — E11.8 TYPE 2 DIABETES MELLITUS WITH COMPLICATION, WITHOUT LONG-TERM CURRENT USE OF INSULIN (H): Primary | ICD-10-CM

## 2021-11-04 LAB
ALBUMIN SERPL-MCNC: 4.4 G/DL (ref 3.6–5.1)
ALBUMIN/GLOB SERPL: 1.6 {RATIO} (ref 1–2.5)
ALP SERPL-CCNC: 65 U/L (ref 33–130)
ALT 1742-6: 14 U/L (ref 0–32)
AST 1920-8: 11 U/L (ref 0–35)
BILIRUB SERPL-MCNC: 0.6 MG/DL (ref 0.2–1.2)
BUN SERPL-MCNC: 15 MG/DL (ref 7–25)
BUN/CREATININE RATIO: 20.5 (ref 6–22)
CALCIUM SERPL-MCNC: 9.5 MG/DL (ref 8.6–10.3)
CHLORIDE SERPLBLD-SCNC: 100.9 MMOL/L (ref 98–110)
CHOLEST SERPL-MCNC: 161 MG/DL (ref 0–199)
CHOLEST/HDLC SERPL: 3 {RATIO} (ref 0–5)
CO2 SERPL-SCNC: 28.6 MMOL/L (ref 20–32)
CREAT SERPL-MCNC: 0.73 MG/DL (ref 0.6–1.3)
GLOBULIN, CALCULATED - QUEST: 2.8 (ref 1.9–3.7)
GLUCOSE SERPL-MCNC: 178 MG/DL (ref 60–99)
HBA1C MFR BLD: 7.5 % (ref 4–7)
HDLC SERPL-MCNC: 58 MG/DL (ref 40–150)
LDLC SERPL CALC-MCNC: 78 MG/DL (ref 0–130)
POTASSIUM SERPL-SCNC: 4.3 MMOL/L (ref 3.5–5.3)
PROT SERPL-MCNC: 7.2 G/DL (ref 6.1–8.1)
SODIUM SERPL-SCNC: 137.1 MMOL/L (ref 135–146)
TRIGL SERPL-MCNC: 126 MG/DL (ref 0–149)

## 2021-11-04 PROCEDURE — 80053 COMPREHEN METABOLIC PANEL: CPT | Performed by: FAMILY MEDICINE

## 2021-11-04 PROCEDURE — 80061 LIPID PANEL: CPT | Performed by: FAMILY MEDICINE

## 2021-11-04 PROCEDURE — 36415 COLL VENOUS BLD VENIPUNCTURE: CPT | Performed by: FAMILY MEDICINE

## 2021-11-04 PROCEDURE — 99214 OFFICE O/P EST MOD 30 MIN: CPT | Performed by: FAMILY MEDICINE

## 2021-11-04 PROCEDURE — 83036 HEMOGLOBIN GLYCOSYLATED A1C: CPT | Performed by: FAMILY MEDICINE

## 2021-11-04 RX ORDER — METOPROLOL SUCCINATE 50 MG/1
50 TABLET, EXTENDED RELEASE ORAL DAILY
Qty: 90 TABLET | Refills: 1 | Status: SHIPPED | OUTPATIENT
Start: 2021-11-04 | End: 2022-04-29

## 2021-11-04 RX ORDER — ATORVASTATIN CALCIUM 20 MG/1
20 TABLET, FILM COATED ORAL DAILY
Qty: 90 TABLET | Refills: 1 | Status: SHIPPED | OUTPATIENT
Start: 2021-11-04 | End: 2022-04-29

## 2021-11-04 RX ORDER — GLIPIZIDE 5 MG/1
TABLET, EXTENDED RELEASE ORAL
COMMUNITY
Start: 2021-07-29

## 2021-11-04 RX ORDER — VALSARTAN AND HYDROCHLOROTHIAZIDE 160; 12.5 MG/1; MG/1
1 TABLET, FILM COATED ORAL DAILY
Qty: 90 TABLET | Refills: 1 | Status: SHIPPED | OUTPATIENT
Start: 2021-11-04 | End: 2022-04-29

## 2021-11-04 ASSESSMENT — MIFFLIN-ST. JEOR: SCORE: 1202.69

## 2021-11-04 NOTE — PROGRESS NOTES
SUBJECTIVE:  Shayla Winter is an 66 year old female who presents for evaluation and treatment   of Type 2 diabetes mellitus followed by Endocrinology and hypertension/elevated cholesterol followed by me. Back to Endocrinology      Age at diagnosis 52. Family history   positive for diabetes in the patient s mother.   Previous treatment modalities employed include diet, oral agents, exercise and ASA.   Current treatment includes diet, oral agents, exercise and ASA.     Current monitoring regimen: home blood tests - none  Home blood sugar records: refuses?????  Last HgbA1c: 7.5  Diabetic complications: peripheral neuropathy and cardiovascular disease  Cardiovascular risk factors: family history, lipids, diabetes mellitus, hypertension, obesity and sedentary life style    Current Outpatient Medications   Medication     aspirin 81 MG tablet     atorvastatin (LIPITOR) 20 MG tablet     GABRIELA CONTOUR NEXT test strip     linagliptin-metFORMIN ER (JENTADUETO XR) 5-1000 MG per tablet     metFORMIN (GLUCOPHAGE-XR) 500 MG 24 hr tablet     metoprolol succinate ER (TOPROL-XL) 50 MG 24 hr tablet     valsartan-hydrochlorothiazide (DIOVAN HCT) 160-12.5 MG tablet     GLIPIZIDE XL 5 MG 24 hr tablet     No current facility-administered medications for this visit.     Allergies   Allergen Reactions     Niacin Hives     hives,swollen     Adhesive Tape Rash       Social History     Tobacco Use     Smoking status: Never Smoker     Smokeless tobacco: Never Used   Substance Use Topics     Alcohol use: Yes     Alcohol/week: 0.8 standard drinks     Types: 1 Standard drinks or equivalent per week     Comment: once a week       Review Of Systems  Skin: negative  Eyes: negative  Ears/Nose/Throat: hearing loss  Respiratory: No shortness of breath, dyspnea on exertion, cough, or hemoptysis  Cardiovascular: hypertension/elevated cholesterol  Gastrointestinal: negative  Genitourinary: negative  Musculoskeletal: negative  Neurologic:  "negative  Psychiatric: negative  Hematologic/Lymphatic/Immunologic: negative  Endocrine: diabetes    OBJECTIVE:  /84 (BP Location: Right arm, Patient Position: Sitting, Cuff Size: Adult Large)   Pulse 79   Temp 97.6  F (36.4  C) (Temporal)   Ht 1.6 m (5' 3\")   Wt 68.9 kg (151 lb 12.8 oz)   LMP 09/29/2005   SpO2 98%   BMI 26.89 kg/m    General appearance: healthy, alert, no distress, cooperative, smiling and over weight  Skin: Skin color, texture, turgor normal. No rashes or lesions.  Eyes: conjunctivae/corneas clear. PERRL, EOM's intact. Fundi benign  Ears: negative  Oropharynx: Lips, mucosa, and tongue normal. Teeth and gums normal.  Neck: Neck supple. No adenopathy. Thyroid symmetric, normal size,, Carotids without bruits.  Lungs: negative, Percussion normal. Good diaphragmatic excursion. Lungs clear  Heart: negative, PMI normal. No lifts, heaves, or thrills. RRR. No murmurs, clicks gallops or rub  Abdomen: Abdomen soft, non-tender. BS normal. No masses, organomegaly  Extremities: Extremities normal. No deformities, edema, or skin discoloration.  Peripheral pulses: radial=4/4, femoral=4/4, popliteal=4/4, dorsalis pedis=4/4,  Neuro: Gait normal. Reflexes normal and symmetric. Sensation grossly WNL.  BMI : Body mass index is 26.89 kg/m .    ASSESSMENT:(E11.8) Type 2 diabetes mellitus with complication, without long-term current use of insulin (H)  (primary encounter diagnosis)  Plan: GLIPIZIDE XL 5 MG 24 hr tablet, HEMOGLOBIN A1C         (BFP), VENOUS COLLECTION, Lipid Panel (BFP),         Comprehensive Metobolic Panel (BFP),         atorvastatin (LIPITOR) 20 MG tablet,         valsartan-hydrochlorothiazide (DIOVAN HCT)         160-12.5 MG tablet        Back to Endocrinology      (E78.2) Mixed hyperlipidemia  Plan: VENOUS COLLECTION, Lipid Panel (BFP),         atorvastatin (LIPITOR) 20 MG tablet        1)  Medication: continue current medication regimen unchanged  2)  Low fat, low cholesterol diet  3)  " Regular aerobic exercise  4)  Recheck in 6 months, sooner should new symptoms or   problems arise.    Patient Education: Reviewed risks of elevated lipids and principles   of treatment.        (I10) Essential hypertension, benign  Plan: VENOUS COLLECTION, Comprehensive Metobolic         Panel (BFP), metoprolol succinate ER         (TOPROL-XL) 50 MG 24 hr tablet,         valsartan-hydrochlorothiazide (DIOVAN HCT)         160-12.5 MG tablet        1)  Medication: continue current medication regimen unchanged  2)  Dietary sodium restriction  3)  Regular aerobic exercise  4)  Recheck in 6 months, sooner should new symptoms or   problems arise.    Patient Education: Reviewed risks of hypertension and principles of   treatment.        (Z79.899) Encounter for long-term (current) use of medications  Plan: HEMOGLOBIN A1C (BFP), VENOUS COLLECTION, Lipid         Panel (BFP), Comprehensive Metobolic Panel         (BFP)                PE:   Reviewed concepts of diabetes self-management stressing the primary   role of the patient in monitoring and maintaining control of   Diabetes.    Total time spent with patient today including visit and non face to face time 30 minutes.

## 2021-11-04 NOTE — LETTER
November 4, 2021      Shayla BASS Maggy  [address]  [place] [address]        Dear ,    We are writing to inform you of your test results.    Need for better diabetes control as we discussed. Take to your specialists.    Resulted Orders   HEMOGLOBIN A1C (BFP)   Result Value Ref Range    Hemoglobin A1C 7.5 (A) 4.0 - 7.0 %   Lipid Panel (BFP)   Result Value Ref Range    Cholesterol 161 0 - 199 mg/dL    Triglycerides 126 0 - 149 mg/dL    HDL Cholesterol 58 40 - 150 mg/dL    LDL Cholesterol Direct 78 0 - 130 mg/dL    Cholesterol/HDL Ratio 3 0 - 5   Comprehensive Metobolic Panel (BFP)   Result Value Ref Range    Carbon Dioxide 28.6 20 - 32 mmol/L    Creatinine 0.73 0.60 - 1.30 mg/dL    Glucose 178 (A) 60 - 99 mg/dL    Sodium 137.1 135 - 146 mmol/L    Potassium 4.30 3.5 - 5.3 mmol/L    Chloride 100.9 98 - 110 mmol/L    Protein Total 7.2 6.1 - 8.1 g/dL    Albumin 4.4 3.6 - 5.1 g/dL    Alkaline Phosphatase 65 33 - 130 U/L    ALT 14 0 - 32 U/L    AST 11 0 - 35 U/L    Bilirubin Total 0.6 0.2 - 1.2 mg/dL    Urea Nitrogen 15 7 - 25 mg/dL    Calcium 9.5 8.6 - 10.3 mg/dL    BUN/Creatinine Ratio 20.5 6 - 22    Globulin Calculated 2.8 1.9 - 3.7    A/G Ratio 1.6 1 - 2.5       If you have any questions or concerns, please call the clinic at the number listed above.       Sincerely,      Latisha Flanagan MD

## 2021-11-04 NOTE — PATIENT INSTRUCTIONS
Back to Endocrinology    Await labs  Reviewed concepts of diabetes self-management stressing the primary   role of the patient in monitoring and maintaining control of   diabetes.      1)  Medication: continue current medication regimen unchanged  2)  Low fat, low cholesterol diet  3)  Regular aerobic exercise  4)  Recheck in 6 months, sooner should new symptoms or   problems arise.    Patient Education: Reviewed risks of elevated lipids and principles   of treatment.

## 2021-11-04 NOTE — NURSING NOTE
Chief Complaint   Patient presents with     Recheck Medication     fasting today, refill medications     Pre-visit Screening:  Immunizations:  up to date  Colonoscopy:  is up to date  Mammogram: is up to date  Asthma Action Test/Plan:  NA  PHQ9:  NA  GAD7:  NA  Questioned patient about current smoking habits Pt. has never smoked.  Ok to leave detailed message on voice mail for today's visit only Yes, phone # [phone]

## 2022-04-29 ENCOUNTER — OFFICE VISIT (OUTPATIENT)
Dept: FAMILY MEDICINE | Facility: CLINIC | Age: 67
End: 2022-04-29

## 2022-04-29 VITALS
OXYGEN SATURATION: 98 % | HEART RATE: 89 BPM | DIASTOLIC BLOOD PRESSURE: 84 MMHG | SYSTOLIC BLOOD PRESSURE: 130 MMHG | WEIGHT: 159.8 LBS | HEIGHT: 63 IN | BODY MASS INDEX: 28.31 KG/M2 | TEMPERATURE: 97.8 F

## 2022-04-29 DIAGNOSIS — E78.49 OTHER HYPERLIPIDEMIA: ICD-10-CM

## 2022-04-29 DIAGNOSIS — I10 ESSENTIAL HYPERTENSION, BENIGN: ICD-10-CM

## 2022-04-29 DIAGNOSIS — E78.2 MIXED HYPERLIPIDEMIA: ICD-10-CM

## 2022-04-29 DIAGNOSIS — E11.8 TYPE 2 DIABETES MELLITUS WITH COMPLICATION, WITHOUT LONG-TERM CURRENT USE OF INSULIN (H): ICD-10-CM

## 2022-04-29 DIAGNOSIS — Z78.0 POSTMENOPAUSE: Primary | ICD-10-CM

## 2022-04-29 LAB
ALBUMIN (URINE) MG/L: 10
ALBUMIN SERPL-MCNC: 4.9 G/DL (ref 3.6–5.1)
ALBUMIN URINE MG/G CR: <30 MG/G CREATININE
ALBUMIN/GLOB SERPL: 2 {RATIO} (ref 1–2.5)
ALP SERPL-CCNC: 64 U/L (ref 33–130)
ALT 1742-6: 22 U/L (ref 0–32)
AST 1920-8: 13 U/L (ref 0–35)
BILIRUB SERPL-MCNC: 0.8 MG/DL (ref 0.2–1.2)
BUN SERPL-MCNC: 16 MG/DL (ref 7–25)
BUN/CREATININE RATIO: 21.1 (ref 6–22)
CALCIUM SERPL-MCNC: 9.6 MG/DL (ref 8.6–10.3)
CHLORIDE SERPLBLD-SCNC: 100.3 MMOL/L (ref 98–110)
CREAT SERPL-MCNC: 0.76 MG/DL (ref 0.6–1.3)
CREATININE URINE MG/DL: 10 MG/DL
GLOBULIN, CALCULATED - QUEST: 2.5 (ref 1.9–3.7)
GLUCOSE SERPL-MCNC: 164 MG/DL (ref 60–99)
HBA1C MFR BLD: 6.7 % (ref 4–7)
POTASSIUM SERPL-SCNC: 4.36 MMOL/L (ref 3.5–5.3)
PROT SERPL-MCNC: 7.4 G/DL (ref 6.1–8.1)
SODIUM SERPL-SCNC: 137.4 MMOL/L (ref 135–146)

## 2022-04-29 PROCEDURE — 82043 UR ALBUMIN QUANTITATIVE: CPT | Performed by: FAMILY MEDICINE

## 2022-04-29 PROCEDURE — 36415 COLL VENOUS BLD VENIPUNCTURE: CPT | Performed by: FAMILY MEDICINE

## 2022-04-29 PROCEDURE — 80053 COMPREHEN METABOLIC PANEL: CPT | Performed by: FAMILY MEDICINE

## 2022-04-29 PROCEDURE — 82570 ASSAY OF URINE CREATININE: CPT | Performed by: FAMILY MEDICINE

## 2022-04-29 PROCEDURE — 99213 OFFICE O/P EST LOW 20 MIN: CPT | Performed by: FAMILY MEDICINE

## 2022-04-29 PROCEDURE — 83036 HEMOGLOBIN GLYCOSYLATED A1C: CPT | Performed by: FAMILY MEDICINE

## 2022-04-29 RX ORDER — METOPROLOL SUCCINATE 50 MG/1
50 TABLET, EXTENDED RELEASE ORAL DAILY
Qty: 90 TABLET | Refills: 1 | Status: SHIPPED | OUTPATIENT
Start: 2022-04-29 | End: 2022-10-28

## 2022-04-29 RX ORDER — ATORVASTATIN CALCIUM 20 MG/1
20 TABLET, FILM COATED ORAL DAILY
Qty: 90 TABLET | Refills: 1 | Status: SHIPPED | OUTPATIENT
Start: 2022-04-29 | End: 2022-10-28

## 2022-04-29 RX ORDER — VALSARTAN AND HYDROCHLOROTHIAZIDE 160; 12.5 MG/1; MG/1
1 TABLET, FILM COATED ORAL DAILY
Qty: 90 TABLET | Refills: 1 | Status: SHIPPED | OUTPATIENT
Start: 2022-04-29 | End: 2022-10-28

## 2022-04-29 NOTE — LETTER
2022      Cici Winter  [address]  [place] [address]        Dear ,    We are writing to inform you of your test results.    Your dexa scan showed normal bone density. Take calcium 8082-7008 mg/day + vitamin d, do weight bearing exercise 30-40 minutes 3-4 times per week and recheck a dexa scan in 5 years.      Resulted Orders   Dexa hip/pelvis/spine*    Narrative    [address], [address]   [place] [address]   Phone: [phone]   [place]  : 1955 Req Phys: Shagufta Nichols MD  Patient name: CICI WINTER Clinic: [facility]   Dept No: [id number]  BONE DENSITY Exam Date: 2022 Accession: [id number]  EXAM: BONE DENSITY  LOCATION: [facility]  DATE/TIME: 2022 8:51 AM  INDICATION: Postmenopause.  COMPARISON: None.  TECHNIQUE: Dual-energy x-ray absorptiometry performed with routine technique.  FINDINGS:  Lumbar Spine: L1-L4: BMD: 1.141 g/cm2. T-score: 0.9. Z-score: 2.7  LEFT Radius 33%: BMD: 0.740 g/cm2. T-score: 1.0. Z-score: 2.7  WHO Criteria:  Normal: T score at or above -1 SD  Osteopenia: T score between -1 and -2.5 SD  Osteoporosis: T score at or below -2.5 SD  COMPARISON: None.  FRAX Results: Not applicable due to Normal bone mineral density.  IMPRESSION: NORMAL. Bone mineral density measurements are within normal limits using T score.   Dictated By: DONITA BEEBE M.D.  Password protected electronic signature by: USHA  Trans: SHANE  Date Of Trans: 2022 9:18:00AM  Date report approved and signed by interpreting physician: 2022 9:45:00AM  Page 1 of 1   HEMOGLOBIN A1C (BFP)   Result Value Ref Range    Hemoglobin A1C POCT 6.7 4.0 - 7.0 %   Comprehensive Metobolic Panel (BFP)   Result Value Ref Range    Creatinine 0.76 0.60 - 1.30 mg/dL    Glucose 164 (A) 60 - 99 mg/dL    Sodium 137.4 135 - 146 mmol/L    Potassium 4.36 3.5 - 5.3 mmol/L    Chloride 100.3 98 - 110 mmol/L    Protein  Total 7.4 6.1 - 8.1 g/dL    Albumin 4.9 3.6 - 5.1 g/dL    Alkaline Phosphatase 64 33 - 130 U/L    ALT 22 0 - 32 U/L    AST 13 0 - 35 U/L    Bilirubin Total 0.8 0.2 - 1.2 mg/dL    Urea Nitrogen 16 7 - 25 mg/dL    Calcium 9.6 8.6 - 10.3 mg/dL    BUN/Creatinine Ratio 21.1 6 - 22    Globulin Calculated 2.5 1.9 - 3.7    A/G Ratio 2.0 1 - 2.5   ALBUMIN RANDOM URINE QUANTITATIVE (BFP)   Result Value Ref Range    Albumin mg/L 10 30    Creatinine Urine mg/dL 10 300 mg/dL    Albumin Urine mg/g Cr <30 30 MG/G Creatinine       If you have any questions or concerns, please call the clinic at the number listed above.       Sincerely,      Shagufta Nichols MD

## 2022-04-29 NOTE — NURSING NOTE
Chief Complaint   Patient presents with     Recheck Medication     Fasting today, refill medications      Pre-visit Screening:  Immunizations:  not up to date - pt would like to wait getting covid booster tomorrow  Colonoscopy:  is up to date  Mammogram: is up to date  Asthma Action Test/Plan:  CARMEN  PHQ9:  NA  GAD7:  CARMEN  Questioned patient about current smoking habits Pt. has never smoked.  Ok to leave detailed message on voice mail for today's visit only Yes, phone # [phone]

## 2022-04-29 NOTE — PATIENT INSTRUCTIONS
"Assessment & Plan   Problem List Items Addressed This Visit          Endocrine    Hyperlipidemia    Type 2 diabetes mellitus with complication, without long-term current use of insulin (H)    Relevant Orders    HEMOGLOBIN A1C (BFP)    VENOUS COLLECTION (Completed)       Circulatory    Essential hypertension, benign          Other Visit Diagnoses       Postmenopause    -  Primary           Results for orders placed or performed in visit on 04/29/22   HEMOGLOBIN A1C (BFP)     Status: None   Result Value Ref Range    Hemoglobin A1C POCT 6.7 4.0 - 7.0 %     1. Type 2 diabetes mellitus with complication, without long-term current use of insulin (H)  Controlled, followup with endocrinology.  - atorvastatin (LIPITOR) 20 MG tablet; Take 1 tablet (20 mg) by mouth daily  Dispense: 90 tablet; Refill: 1  - valsartan-hydrochlorothiazide (DIOVAN HCT) 160-12.5 MG tablet; Take 1 tablet by mouth daily  Dispense: 90 tablet; Refill: 1  - HEMOGLOBIN A1C (BFP)  - VENOUS COLLECTION  - Comprehensive Metobolic Panel (BFP)  - ALBUMIN RANDOM URINE QUANTITATIVE (BFP)    2. Mixed hyperlipidemia  Check labs, refilled medications.  - atorvastatin (LIPITOR) 20 MG tablet; Take 1 tablet (20 mg) by mouth daily  Dispense: 90 tablet; Refill: 1    3. Essential hypertension, benign  Controlled, check labs, refilled.  - metoprolol succinate ER (TOPROL-XL) 50 MG 24 hr tablet; Take 1 tablet (50 mg) by mouth daily  Dispense: 90 tablet; Refill: 1  - valsartan-hydrochlorothiazide (DIOVAN HCT) 160-12.5 MG tablet; Take 1 tablet by mouth daily  Dispense: 90 tablet; Refill: 1    4. Postmenopause    - Dexa hip/pelvis/spine*  - Radiology Referral; Future    5. Other hyperlipidemia             BMI:   Estimated body mass index is 28.31 kg/m  as calculated from the following:    Height as of this encounter: 1.6 m (5' 3\").    Weight as of this encounter: 72.5 kg (159 lb 12.8 oz).         FUTURE APPOINTMENTS:       - Follow-up visit in 6 months    No follow-ups on " file.    Shagufta Nichols MD  [facility]

## 2022-04-29 NOTE — PROGRESS NOTES
"Assessment & Plan   Problem List Items Addressed This Visit        Endocrine    Hyperlipidemia    Type 2 diabetes mellitus with complication, without long-term current use of insulin (H)    Relevant Orders    HEMOGLOBIN A1C (BFP)    VENOUS COLLECTION (Completed)       Circulatory    Essential hypertension, benign      Other Visit Diagnoses     Postmenopause    -  Primary         Results for orders placed or performed in visit on 04/29/22   HEMOGLOBIN A1C (BFP)     Status: None   Result Value Ref Range    Hemoglobin A1C POCT 6.7 4.0 - 7.0 %     1. Type 2 diabetes mellitus with complication, without long-term current use of insulin (H)  Controlled, followup with endocrinology.  - atorvastatin (LIPITOR) 20 MG tablet; Take 1 tablet (20 mg) by mouth daily  Dispense: 90 tablet; Refill: 1  - valsartan-hydrochlorothiazide (DIOVAN HCT) 160-12.5 MG tablet; Take 1 tablet by mouth daily  Dispense: 90 tablet; Refill: 1  - HEMOGLOBIN A1C (BFP)  - VENOUS COLLECTION  - Comprehensive Metobolic Panel (BFP)  - ALBUMIN RANDOM URINE QUANTITATIVE (BFP)    2. Mixed hyperlipidemia  Check labs, refilled medications.  - atorvastatin (LIPITOR) 20 MG tablet; Take 1 tablet (20 mg) by mouth daily  Dispense: 90 tablet; Refill: 1    3. Essential hypertension, benign  Controlled, check labs, refilled.  - metoprolol succinate ER (TOPROL-XL) 50 MG 24 hr tablet; Take 1 tablet (50 mg) by mouth daily  Dispense: 90 tablet; Refill: 1  - valsartan-hydrochlorothiazide (DIOVAN HCT) 160-12.5 MG tablet; Take 1 tablet by mouth daily  Dispense: 90 tablet; Refill: 1    4. Postmenopause    - Dexa hip/pelvis/spine*  - Radiology Referral; Future    5. Other hyperlipidemia             BMI:   Estimated body mass index is 28.31 kg/m  as calculated from the following:    Height as of this encounter: 1.6 m (5' 3\").    Weight as of this encounter: 72.5 kg (159 lb 12.8 oz).         FUTURE APPOINTMENTS:       - Follow-up visit in 6 months    No follow-ups on file.    Shagufta TAYLOR" "MD Simone  [facility]    Subjective     Nursing Notes:   MoralesMragarita, [facility]  4/29/2022  8:22 AM  Signed  Chief Complaint   Patient presents with     Recheck Medication     Fasting today, refill medications      Pre-visit Screening:  Immunizations:  not up to date - pt would like to wait getting covid booster tomorrow  Colonoscopy:  is up to date  Mammogram: is up to date  Asthma Action Test/Plan:  NA  PHQ9:  NA  GAD7:  NA  Questioned patient about current smoking habits Pt. has never smoked.  Ok to leave detailed message on voice mail for today's visit only Yes, phone # [phone]           Shayla Winter is a 66 year old female who presents to clinic today for the following health issues   HPI     Here for medication check and refills.   Tried to eat healthy, home blood sugars are around 140 or less. Took away a different jentadueto in the summer then started glipizide. Added this to the metformin. Checks blood sugars at home. Sometimes after breakfast is 75.    Blood pressures at home--doesn't check very often, but thinks that it's around the normal level. No problems.  Weight is up.    Taking cholesterol medication regularly. No problems or side effects.        Review of Systems   Constitutional, HEENT, cardiovascular, pulmonary, gi and gu systems are negative, except as otherwise noted.      Objective    /84 (BP Location: Left arm, Patient Position: Sitting, Cuff Size: Adult Large)   Pulse 89   Temp 97.8  F (36.6  C) (Temporal)   Ht 1.6 m (5' 3\")   Wt 72.5 kg (159 lb 12.8 oz)   LMP 09/29/2005   SpO2 98%   BMI 28.31 kg/m    Body mass index is 28.31 kg/m .  Physical Exam   GENERAL: healthy, alert and no distress  RESP: lungs clear to auscultation - no rales, rhonchi or wheezes  CV: regular rate and rhythm, normal S1 S2, no S3 or S4, no murmur, click or rub, no peripheral edema and peripheral pulses strong  ABDOMEN: soft, nontender, no hepatosplenomegaly, no masses and bowel " sounds normal  MS: no gross musculoskeletal defects noted, no edema  NEURO: Normal strength and tone, mentation intact and speech normal  PSYCH: mentation appears normal, affect normal/bright    Results for orders placed or performed in visit on 04/29/22   HEMOGLOBIN A1C (BFP)     Status: None   Result Value Ref Range    Hemoglobin A1C POCT 6.7 4.0 - 7.0 %   Comprehensive Metobolic Panel (BFP)     Status: Abnormal   Result Value Ref Range    Creatinine 0.76 0.60 - 1.30 mg/dL    Glucose 164 (A) 60 - 99 mg/dL    Sodium 137.4 135 - 146 mmol/L    Potassium 4.36 3.5 - 5.3 mmol/L    Chloride 100.3 98 - 110 mmol/L    Protein Total 7.4 6.1 - 8.1 g/dL    Albumin 4.9 3.6 - 5.1 g/dL    Alkaline Phosphatase 64 33 - 130 U/L    ALT 22 0 - 32 U/L    AST 13 0 - 35 U/L    Bilirubin Total 0.8 0.2 - 1.2 mg/dL    Urea Nitrogen 16 7 - 25 mg/dL    Calcium 9.6 8.6 - 10.3 mg/dL    BUN/Creatinine Ratio 21.1 6 - 22    Globulin Calculated 2.5 1.9 - 3.7    A/G Ratio 2.0 1 - 2.5   ALBUMIN RANDOM URINE QUANTITATIVE (BFP)     Status: None   Result Value Ref Range    Albumin mg/L 10 30    Creatinine Urine mg/dL 10 300 mg/dL    Albumin Urine mg/g Cr <30 30 MG/G Creatinine

## 2022-04-29 NOTE — LETTER
April 29, 2022      Shayla Winter  [address]  [place] [address]        Dear ,    We are writing to inform you of your test results.    Your labs were all good: kidney and liver function  Urine protein.    Resulted Orders   HEMOGLOBIN A1C (BFP)   Result Value Ref Range    Hemoglobin A1C POCT 6.7 4.0 - 7.0 %   Comprehensive Metobolic Panel (BFP)   Result Value Ref Range    Creatinine 0.76 0.60 - 1.30 mg/dL    Glucose 164 (A) 60 - 99 mg/dL    Sodium 137.4 135 - 146 mmol/L    Potassium 4.36 3.5 - 5.3 mmol/L    Chloride 100.3 98 - 110 mmol/L    Protein Total 7.4 6.1 - 8.1 g/dL    Albumin 4.9 3.6 - 5.1 g/dL    Alkaline Phosphatase 64 33 - 130 U/L    ALT 22 0 - 32 U/L    AST 13 0 - 35 U/L    Bilirubin Total 0.8 0.2 - 1.2 mg/dL    Urea Nitrogen 16 7 - 25 mg/dL    Calcium 9.6 8.6 - 10.3 mg/dL    BUN/Creatinine Ratio 21.1 6 - 22    Globulin Calculated 2.5 1.9 - 3.7    A/G Ratio 2.0 1 - 2.5   ALBUMIN RANDOM URINE QUANTITATIVE (BFP)   Result Value Ref Range    Albumin mg/L 10 30    Creatinine Urine mg/dL 10 300 mg/dL    Albumin Urine mg/g Cr <30 30 MG/G Creatinine       If you have any questions or concerns, please call the clinic at the number listed above.       Sincerely,      Shagufta Nichols MD

## 2022-10-28 ENCOUNTER — TRANSFERRED RECORDS (OUTPATIENT)
Dept: FAMILY MEDICINE | Facility: CLINIC | Age: 67
End: 2022-10-28

## 2022-10-28 ENCOUNTER — OFFICE VISIT (OUTPATIENT)
Dept: FAMILY MEDICINE | Facility: CLINIC | Age: 67
End: 2022-10-28

## 2022-10-28 VITALS
HEART RATE: 79 BPM | BODY MASS INDEX: 29.16 KG/M2 | SYSTOLIC BLOOD PRESSURE: 132 MMHG | WEIGHT: 164.6 LBS | OXYGEN SATURATION: 98 % | HEIGHT: 63 IN | DIASTOLIC BLOOD PRESSURE: 82 MMHG | TEMPERATURE: 98.2 F

## 2022-10-28 DIAGNOSIS — E78.49 OTHER HYPERLIPIDEMIA: ICD-10-CM

## 2022-10-28 DIAGNOSIS — Z23 ENCOUNTER FOR VACCINATION: ICD-10-CM

## 2022-10-28 DIAGNOSIS — E11.8 TYPE 2 DIABETES MELLITUS WITH COMPLICATION, WITHOUT LONG-TERM CURRENT USE OF INSULIN (H): ICD-10-CM

## 2022-10-28 DIAGNOSIS — E11.8 TYPE 2 DIABETES MELLITUS WITH COMPLICATION, WITHOUT LONG-TERM CURRENT USE OF INSULIN (H): Primary | ICD-10-CM

## 2022-10-28 DIAGNOSIS — I10 ESSENTIAL HYPERTENSION, BENIGN: ICD-10-CM

## 2022-10-28 DIAGNOSIS — E78.2 MIXED HYPERLIPIDEMIA: ICD-10-CM

## 2022-10-28 LAB
ALBUMIN SERPL-MCNC: 4.3 G/DL (ref 3.6–5.1)
ALBUMIN/GLOB SERPL: 1.4 {RATIO} (ref 1–2.5)
ALP SERPL-CCNC: 70 U/L (ref 33–130)
ALT 1742-6: 17 U/L (ref 0–32)
AST 1920-8: 14 U/L (ref 0–35)
BILIRUB SERPL-MCNC: 0.6 MG/DL (ref 0.2–1.2)
BUN SERPL-MCNC: 17 MG/DL (ref 7–25)
BUN/CREATININE RATIO: 23.3 (ref 6–22)
CALCIUM SERPL-MCNC: 10 MG/DL (ref 8.6–10.3)
CHLORIDE SERPLBLD-SCNC: 101.6 MMOL/L (ref 98–110)
CHOLEST SERPL-MCNC: 171 MG/DL (ref 0–199)
CHOLEST/HDLC SERPL: 3 {RATIO} (ref 0–5)
CO2 SERPL-SCNC: 27.2 MMOL/L (ref 20–32)
CREAT SERPL-MCNC: 0.73 MG/DL (ref 0.6–1.3)
GLOBULIN, CALCULATED - QUEST: 3.1 (ref 1.9–3.7)
GLUCOSE SERPL-MCNC: 136 MG/DL (ref 60–99)
HDLC SERPL-MCNC: 60 MG/DL (ref 40–150)
LDLC SERPL CALC-MCNC: 79 MG/DL (ref 0–130)
POTASSIUM SERPL-SCNC: 4.3 MMOL/L (ref 3.5–5.3)
PROT SERPL-MCNC: 7.4 G/DL (ref 6.1–8.1)
SODIUM SERPL-SCNC: 139.3 MMOL/L (ref 135–146)
TRIGL SERPL-MCNC: 160 MG/DL (ref 0–149)

## 2022-10-28 PROCEDURE — 80061 LIPID PANEL: CPT | Performed by: FAMILY MEDICINE

## 2022-10-28 PROCEDURE — 90677 PCV20 VACCINE IM: CPT | Performed by: FAMILY MEDICINE

## 2022-10-28 PROCEDURE — 80053 COMPREHEN METABOLIC PANEL: CPT | Performed by: FAMILY MEDICINE

## 2022-10-28 PROCEDURE — 36415 COLL VENOUS BLD VENIPUNCTURE: CPT | Performed by: FAMILY MEDICINE

## 2022-10-28 PROCEDURE — 99213 OFFICE O/P EST LOW 20 MIN: CPT | Mod: 25 | Performed by: FAMILY MEDICINE

## 2022-10-28 PROCEDURE — G0009 ADMIN PNEUMOCOCCAL VACCINE: HCPCS | Performed by: FAMILY MEDICINE

## 2022-10-28 RX ORDER — ATORVASTATIN CALCIUM 20 MG/1
20 TABLET, FILM COATED ORAL DAILY
Qty: 90 TABLET | Refills: 1 | Status: SHIPPED | OUTPATIENT
Start: 2022-10-28 | End: 2023-04-17

## 2022-10-28 RX ORDER — METOPROLOL SUCCINATE 50 MG/1
50 TABLET, EXTENDED RELEASE ORAL DAILY
Qty: 90 TABLET | Refills: 1 | Status: SHIPPED | OUTPATIENT
Start: 2022-10-28 | End: 2023-04-17

## 2022-10-28 RX ORDER — VALSARTAN AND HYDROCHLOROTHIAZIDE 160; 12.5 MG/1; MG/1
1 TABLET, FILM COATED ORAL DAILY
Qty: 90 TABLET | Refills: 1 | Status: SHIPPED | OUTPATIENT
Start: 2022-10-28 | End: 2023-04-17

## 2022-10-28 NOTE — NURSING NOTE
Chief Complaint   Patient presents with     Recheck Medication     Fasting today, refill medications      Pre-visit Screening:  Immunizations:  not up to date - prevnar 20 today  Colonoscopy:  is up to date  Mammogram: is up to date  Asthma Action Test/Plan:  NA  PHQ9:  NA  GAD7:  NA  Questioned patient about current smoking habits Pt. has never smoked.  Ok to leave detailed message on voice mail for today's visit only Yes, phone # [phone]

## 2022-10-28 NOTE — LETTER
October 31, 2022      Shayla Winter  [address]  [place] [address]        Dear ,    We are writing to inform you of your test results.      Your labs: lipids were good, slightly high triglycerides.  Kidney and liver function were good.    Resulted Orders   Lipid Panel (BFP)   Result Value Ref Range    Cholesterol 171 0 - 199 mg/dL    Triglycerides 160 (A) 0 - 149 mg/dL    HDL Cholesterol 60 40 - 150 mg/dL    LDL Cholesterol Direct 79 0 - 130 mg/dL    Cholesterol/HDL Ratio 3 0 - 5   Comprehensive Metobolic Panel (BFP)   Result Value Ref Range    Carbon Dioxide 27.2 20 - 32 mmol/L    Creatinine 0.73 0.60 - 1.30 mg/dL    Glucose 136 (A) 60 - 99 mg/dL    Sodium 139.3 135 - 146 mmol/L    Potassium 4.30 3.5 - 5.3 mmol/L    Chloride 101.6 98 - 110 mmol/L    Protein Total 7.4 6.1 - 8.1 g/dL    Albumin 4.3 3.6 - 5.1 g/dL    Alkaline Phosphatase 70 33 - 130 U/L    ALT 17 0 - 32 U/L    AST 14 0 - 35 U/L    Bilirubin Total 0.6 0.2 - 1.2 mg/dL    Urea Nitrogen 17 7 - 25 mg/dL    Calcium 10.0 8.6 - 10.3 mg/dL    BUN/Creatinine Ratio 23.3 (A) 6 - 22    Globulin Calculated 3.1 1.9 - 3.7    A/G Ratio 1.4 1 - 2.5       If you have any questions or concerns, please call the clinic at the number listed above.       Sincerely,      Shagufta Nichols MD

## 2022-10-28 NOTE — PROGRESS NOTES
"Assessment & Plan   Problem List Items Addressed This Visit    None     1. Type 2 diabetes mellitus with complication, without long-term current use of insulin (H)--followed by endocrinology, Dr. Don  Followed by endocrinology.  - VENOUS COLLECTION  - Lipid Panel (BFP)  - Comprehensive Metobolic Panel (BFP)  - valsartan-hydrochlorothiazide (DIOVAN HCT) 160-12.5 MG tablet; Take 1 tablet by mouth daily  Dispense: 90 tablet; Refill: 1  - atorvastatin (LIPITOR) 20 MG tablet; Take 1 tablet (20 mg) by mouth daily  Dispense: 90 tablet; Refill: 1    2. Other hyperlipidemia  Check labs, refilled medications.    3. Essential hypertension, benign  Controlled, check labs, refilled.  - valsartan-hydrochlorothiazide (DIOVAN HCT) 160-12.5 MG tablet; Take 1 tablet by mouth daily  Dispense: 90 tablet; Refill: 1  - metoprolol succinate ER (TOPROL XL) 50 MG 24 hr tablet; Take 1 tablet (50 mg) by mouth daily  Dispense: 90 tablet; Refill: 1    4. Type 2 diabetes mellitus with complication, without long-term current use of insulin (H)    - VENOUS COLLECTION  - Lipid Panel (BFP)  - Comprehensive Metobolic Panel (BFP)  - valsartan-hydrochlorothiazide (DIOVAN HCT) 160-12.5 MG tablet; Take 1 tablet by mouth daily  Dispense: 90 tablet; Refill: 1  - atorvastatin (LIPITOR) 20 MG tablet; Take 1 tablet (20 mg) by mouth daily  Dispense: 90 tablet; Refill: 1    5. Mixed hyperlipidemia    - atorvastatin (LIPITOR) 20 MG tablet; Take 1 tablet (20 mg) by mouth daily  Dispense: 90 tablet; Refill: 1    6. Encounter for vaccination    - PNEUMOCOCCAL 20 VALENT CONJUGATE (PREVNAR 20)           BMI:   Estimated body mass index is 28.31 kg/m  as calculated from the following:    Height as of 4/29/22: 1.6 m (5' 3\").    Weight as of 4/29/22: 72.5 kg (159 lb 12.8 oz).         FUTURE APPOINTMENTS:       - Follow-up visit in 6 months.    No follow-ups on file.    Shagufta Nichols MD  [facility]    Subjective     There are no exam notes on " file for this visit.     Shayla Winter is a 67 year old female who presents to clinic today for the following health issues HPI     Here for medications and refills on her blood pressure and cholesterol. She is doing well.   Diabetes--followed by endocrinology.  Blood pressure--checks at home this is controlled. She is doing well on the medictitons.        Review of Systems   Constitutional, HEENT, cardiovascular, pulmonary, gi and gu systems are negative, except as otherwise noted.      Objective    LMP 09/29/2005   There is no height or weight on file to calculate BMI.  Physical Exam   GENERAL: healthy, alert and no distress  RESP: lungs clear to auscultation - no rales, rhonchi or wheezes  CV: regular rate and rhythm, normal S1 S2, no S3 or S4, no murmur, click or rub, no peripheral edema and peripheral pulses strong  ABDOMEN: soft, nontender, no hepatosplenomegaly, no masses and bowel sounds normal  MS: no gross musculoskeletal defects noted, no edema  NEURO: Normal strength and tone, mentation intact and speech normal  PSYCH: mentation appears normal, affect normal/bright    No results found for any visits on 10/28/22.

## 2023-04-11 NOTE — PROGRESS NOTES
"Assessment & Plan   Problem List Items Addressed This Visit        Endocrine    Type 2 diabetes mellitus with complication, without long-term current use of insulin (H)--followed by endocrinology, Dr. Don    Relevant Orders    HEMOGLOBIN A1C (BFP) (Completed)    VENOUS COLLECTION (Completed)    ALBUMIN RANDOM URINE QUANTITATIVE (BFP)       Circulatory    Essential hypertension, benign   Other Visit Diagnoses     Initial Medicare annual wellness visit    -  Primary    Mixed hyperlipidemia             1. Initial Medicare annual wellness visit  Completed.    2. Type 2 diabetes mellitus with complication, without long-term current use of insulin (H)  Followed by endocrinology  - HEMOGLOBIN A1C (BFP)  - VENOUS COLLECTION  - ALBUMIN RANDOM URINE QUANTITATIVE (BFP)  - atorvastatin (LIPITOR) 20 MG tablet; Take 1 tablet (20 mg) by mouth daily  Dispense: 90 tablet; Refill: 1  - valsartan-hydrochlorothiazide (DIOVAN HCT) 160-12.5 MG tablet; Take 1 tablet by mouth daily  Dispense: 90 tablet; Refill: 1  - Comprehensive Metobolic Panel (BFP)    3. Mixed hyperlipidemia  Check labs, refilled medications.  - atorvastatin (LIPITOR) 20 MG tablet; Take 1 tablet (20 mg) by mouth daily  Dispense: 90 tablet; Refill: 1    4. Essential hypertension, benign  Check labs, refilled medications, high today. We discussed changing medications. Watch at home. If higher than 130/80. Should be seen.  - metoprolol succinate ER (TOPROL XL) 50 MG 24 hr tablet; Take 1 tablet (50 mg) by mouth daily  Dispense: 90 tablet; Refill: 1  - valsartan-hydrochlorothiazide (DIOVAN HCT) 160-12.5 MG tablet; Take 1 tablet by mouth daily  Dispense: 90 tablet; Refill: 1           BMI:   Estimated body mass index is 29.52 kg/m  as calculated from the following:    Height as of this encounter: 1.588 m (5' 2.5\").    Weight as of this encounter: 74.4 kg (164 lb).         FUTURE APPOINTMENTS:       - Follow-up visit in 6 months.    No follow-ups on file.    Shagufta TAYLOR" "MD Simone  [facility]    Subjective     Nursing Notes:   Catalina Kahn  4/17/2023  8:34 AM  Signed  Chief Complaint   Patient presents with     Recheck Medication     Fasting med refill     Wellness Visit         Pre-visit Screening:  Immunizations:  up to date  Colonoscopy:  is up to date  Mammogram: is up to date  Asthma Action Test/Plan:  NA  PHQ9:  NA  GAD7:  NA  Questioned patient about current smoking habits Pt. has never smoked.  Ok to leave detailed message on voice mail for today's visit only Yes, phone # [phone]             Shayla Winter is a 67 year old female who presents to clinic today for the following health issues     HPI     Here to followup on her diabetes--sees endocrinology.    Also due for lipids and blood pressure check. She is doiing well on medications. Slightly high triglycerides.     Her hgba1c is good.    Blood pressure is high today.         Review of Systems   Constitutional, HEENT, cardiovascular, pulmonary, gi and gu systems are negative, except as otherwise noted.      Objective    BP (!) 140/88 (BP Location: Right arm, Patient Position: Sitting, Cuff Size: Adult Large)   Pulse 75   Temp 97.1  F (36.2  C) (Temporal)   Ht 1.588 m (5' 2.5\")   Wt 74.4 kg (164 lb)   LMP 09/14/2005   SpO2 99%   BMI 29.52 kg/m    Body mass index is 29.52 kg/m .  Physical Exam   GENERAL: healthy, alert and no distress  RESP: lungs clear to auscultation - no rales, rhonchi or wheezes  CV: regular rate and rhythm, normal S1 S2, no S3 or S4, no murmur, click or rub, no peripheral edema and peripheral pulses strong  MS: no gross musculoskeletal defects noted, no edema  NEURO: Normal strength and tone, mentation intact and speech normal  PSYCH: mentation appears normal, affect normal/bright    Results for orders placed or performed in visit on 04/17/23   HEMOGLOBIN A1C (BFP)     Status: None   Result Value Ref Range    Hemoglobin A1C 6.6 4.0 - 7.0 %   ALBUMIN RANDOM URINE " QUANTITATIVE (BFP)     Status: None   Result Value Ref Range    Albumin mg/L 10 30    Creatinine Urine mg/dL 10 300 mg/dL    Albumin Urine mg/g Cr <30 30 MG/G Creatinine

## 2023-04-17 ENCOUNTER — OFFICE VISIT (OUTPATIENT)
Dept: FAMILY MEDICINE | Facility: CLINIC | Age: 68
End: 2023-04-17

## 2023-04-17 VITALS
BODY MASS INDEX: 29.06 KG/M2 | TEMPERATURE: 97.1 F | SYSTOLIC BLOOD PRESSURE: 142 MMHG | DIASTOLIC BLOOD PRESSURE: 84 MMHG | OXYGEN SATURATION: 99 % | HEART RATE: 75 BPM | HEIGHT: 63 IN | WEIGHT: 164 LBS

## 2023-04-17 DIAGNOSIS — I10 ESSENTIAL HYPERTENSION, BENIGN: ICD-10-CM

## 2023-04-17 DIAGNOSIS — Z00.00 INITIAL MEDICARE ANNUAL WELLNESS VISIT: Primary | ICD-10-CM

## 2023-04-17 DIAGNOSIS — E11.8 TYPE 2 DIABETES MELLITUS WITH COMPLICATION, WITHOUT LONG-TERM CURRENT USE OF INSULIN (H): ICD-10-CM

## 2023-04-17 DIAGNOSIS — E78.2 MIXED HYPERLIPIDEMIA: ICD-10-CM

## 2023-04-17 LAB
ALBUMIN (URINE) MG/L: 10
ALBUMIN SERPL-MCNC: 4.7 G/DL (ref 3.6–5.1)
ALBUMIN URINE MG/G CR: <30 MG/G CREATININE
ALBUMIN/GLOB SERPL: 1.8 {RATIO} (ref 1–2.5)
ALP SERPL-CCNC: 72 U/L (ref 33–130)
ALT 1742-6: 13 U/L (ref 0–32)
AST 1920-8: 9 U/L (ref 0–35)
BILIRUB SERPL-MCNC: 0.7 MG/DL (ref 0.2–1.2)
BUN SERPL-MCNC: 12 MG/DL (ref 7–25)
BUN/CREATININE RATIO: 16.9 (ref 6–22)
CALCIUM SERPL-MCNC: 10.1 MG/DL (ref 8.6–10.3)
CHLORIDE SERPLBLD-SCNC: 101.2 MMOL/L (ref 98–110)
CO2 SERPL-SCNC: 26.6 MMOL/L (ref 20–32)
CREAT SERPL-MCNC: 0.71 MG/DL (ref 0.6–1.3)
CREATININE URINE MG/DL: 10 MG/DL
GLOBULIN, CALCULATED - QUEST: 2.6 (ref 1.9–3.7)
GLUCOSE SERPL-MCNC: 175 MG/DL (ref 60–99)
HBA1C MFR BLD: 6.6 % (ref 4–7)
POTASSIUM SERPL-SCNC: 4.04 MMOL/L (ref 3.5–5.3)
PROT SERPL-MCNC: 7.3 G/DL (ref 6.1–8.1)
SODIUM SERPL-SCNC: 138.3 MMOL/L (ref 135–146)

## 2023-04-17 PROCEDURE — 80053 COMPREHEN METABOLIC PANEL: CPT | Performed by: FAMILY MEDICINE

## 2023-04-17 PROCEDURE — 36415 COLL VENOUS BLD VENIPUNCTURE: CPT | Performed by: FAMILY MEDICINE

## 2023-04-17 PROCEDURE — 82570 ASSAY OF URINE CREATININE: CPT | Performed by: FAMILY MEDICINE

## 2023-04-17 PROCEDURE — 83036 HEMOGLOBIN GLYCOSYLATED A1C: CPT | Performed by: FAMILY MEDICINE

## 2023-04-17 PROCEDURE — 82043 UR ALBUMIN QUANTITATIVE: CPT | Performed by: FAMILY MEDICINE

## 2023-04-17 PROCEDURE — G0438 PPPS, INITIAL VISIT: HCPCS | Performed by: FAMILY MEDICINE

## 2023-04-17 PROCEDURE — 99214 OFFICE O/P EST MOD 30 MIN: CPT | Mod: 25 | Performed by: FAMILY MEDICINE

## 2023-04-17 RX ORDER — VALSARTAN AND HYDROCHLOROTHIAZIDE 160; 12.5 MG/1; MG/1
1 TABLET, FILM COATED ORAL DAILY
Qty: 90 TABLET | Refills: 1 | Status: SHIPPED | OUTPATIENT
Start: 2023-04-17 | End: 2023-10-24

## 2023-04-17 RX ORDER — METOPROLOL SUCCINATE 50 MG/1
50 TABLET, EXTENDED RELEASE ORAL DAILY
Qty: 90 TABLET | Refills: 1 | Status: SHIPPED | OUTPATIENT
Start: 2023-04-17 | End: 2023-10-24

## 2023-04-17 RX ORDER — ATORVASTATIN CALCIUM 20 MG/1
20 TABLET, FILM COATED ORAL DAILY
Qty: 90 TABLET | Refills: 1 | Status: SHIPPED | OUTPATIENT
Start: 2023-04-17 | End: 2023-10-24

## 2023-04-17 NOTE — NURSING NOTE
Chief Complaint   Patient presents with     Recheck Medication     Fasting med refill     Wellness Visit         Pre-visit Screening:  Immunizations:  up to date  Colonoscopy:  is up to date  Mammogram: is up to date  Asthma Action Test/Plan:  NA  PHQ9:  NA  GAD7:  NA  Questioned patient about current smoking habits Pt. has never smoked.  Ok to leave detailed message on voice mail for today's visit only Yes, phone # [phone]

## 2023-04-17 NOTE — PROGRESS NOTES
Shayla Winter is a 67 year old female who presents for Medicare Annual Wellness Visit.    Current providers caring for this patient include:  Patient Care Team:  Shagufta Nichols MD as PCP - General (Family Medicine)  Shagufta Nichols MD as Assigned PCP    Complete Medical and Social history reviewed with patient, outlined below.    Patient Active Problem List   Diagnosis     Essential hypertension, benign     Other hyperlipidemia     ACP (advance care planning)     Health Care Home     Overweight (BMI 25.0-29.9)     S/P hip replacement     Type 2 diabetes mellitus with complication, without long-term current use of insulin (H)--followed by endocrinology, Dr. Don     Mixed conductive and sensorineural hearing loss of both ears       Past Medical History:   Diagnosis Date     Arthritis      Diabetes mellitus (H)      Mixed conductive and sensorineural hearing loss of both ears 11/18/2019     Other and unspecified hyperlipidemia 1998    Hyperlipidemia     S/P hip replacement 8/28/2013     Unspecified essential hypertension 1988    Hypertension, Essential       Past Surgical History:   Procedure Laterality Date     ARTHROPLASTY HIP ANTERIOR  8/28/2013    Procedure: ARTHROPLASTY HIP ANTERIOR;  RIGHT DIRECT ANTERIOR TOTAL HIP ARTHROPLASTY (DEPUY)^ (HANA TABLE, C-ARM);  Surgeon: Cricket Castellano MD;  Location: [facility] OR     COLONOSCOPY N/A 7/31/2017    Procedure: COLONOSCOPY;  Colonoscopy ;  Surgeon: Rachelle Vela MD;  Location:  GI     ZZC C-SEC ONLY,PREV C-SEC  1980/1983     Z TREAT ECTOPIC PREG,RMV TUBE/OVARY  1982       Family History   Problem Relation Age of Onset     Hypertension Mother      Lipids Mother      Diabetes Mother         borderline     C.A.D. Mother         bypass     Hypertension Father      Diabetes Other         sister's daughter     Diabetes Type 2  Maternal Aunt      Thyroid Disease No family hx of      Colon Cancer No family hx of        Social History     Tobacco Use      "Smoking status: Never     Passive exposure: Never     Smokeless tobacco: Never   Vaping Use     Vaping status: Not on file   Substance Use Topics     Alcohol use: Yes     Alcohol/week: 0.8 standard drinks of alcohol     Types: 1 Standard drinks or equivalent per week     Comment: once a week       Diet: regular, low salt/low fat, lots of vegetables, some sweets  Physical Activity: patient exercises 7 times weekly  Depression Screen:    Over the past 2 weeks, patient has felt down, depressed, or hopeless:  No    Over the past 2 weeks, patient has felt little interest or pleasure in doing things: No    Functional ability/Safety screen:  Up and go test (able to get up and walk longer than 30 seconds): Passed  Patient needs assistance with: nothing  Patient's home has the following possible safety concerns: none identified  Patient has concerns about her hearing:  Yes  Cognitive Screen  Patient repeats three objects (ball, flag, tree)      Clock drawing test:   NORMAL  Recalls three objects after 3 minutes (ball,flag,tree):                                                                                               recalls 3 objects (3 points)    Physical Exam:  BP (!) 140/88 (BP Location: Right arm, Patient Position: Sitting, Cuff Size: Adult Large)   Pulse 75   Temp 97.1  F (36.2  C) (Temporal)   Ht 1.588 m (5' 2.5\")   Wt 74.4 kg (164 lb)   LMP 09/14/2005   SpO2 99%   BMI 29.52 kg/m     Body mass index is 29.52 kg/m .                          End of Life Planning:   Patient currently has an advanced directive: has at home and will bring in    Education/Counseling:   Based on review of the above information, the following items were addressed:      Diabetes -  access to diabetes self-management training, medical nutrition therapy, and treatment    Appropriate preventive services were discussed with this patient, including applicable screening as appropriate for cardiovascular disease, diabetes, " osteopenia/osteoporosis, and glaucoma.  As appropriate for age/gender, discussed screening for colorectal cancer, prostate cancer, breast cancer, and cervical cancer.   Checklist reviewing preventive services available has been given to the patient.

## 2023-06-01 ENCOUNTER — TRANSFERRED RECORDS (OUTPATIENT)
Dept: FAMILY MEDICINE | Facility: CLINIC | Age: 68
End: 2023-06-01

## 2023-09-25 ENCOUNTER — TRANSFERRED RECORDS (OUTPATIENT)
Dept: FAMILY MEDICINE | Facility: CLINIC | Age: 68
End: 2023-09-25

## 2023-10-11 NOTE — PROGRESS NOTES
"Assessment & Plan   Problem List Items Addressed This Visit          Endocrine    Type 2 diabetes mellitus with complication, without long-term current use of insulin (H)--followed by endocrinology, Dr. Don    Relevant Medications    atorvastatin (LIPITOR) 20 MG tablet    valsartan-hydrochlorothiazide (DIOVAN HCT) 160-12.5 MG tablet    Other Relevant Orders    VENOUS COLLECTION (Completed)    Lipid Panel (BFP)    Comprehensive Metobolic Panel (BFP)       Circulatory    Essential hypertension, benign    Relevant Medications    metoprolol succinate ER (TOPROL XL) 50 MG 24 hr tablet    valsartan-hydrochlorothiazide (DIOVAN HCT) 160-12.5 MG tablet     Other Visit Diagnoses       Mixed hyperlipidemia        Relevant Medications    atorvastatin (LIPITOR) 20 MG tablet           1. Type 2 diabetes mellitus with complication, without long-term current use of insulin (H)  Followed by endocrinology.  - atorvastatin (LIPITOR) 20 MG tablet; Take 1 tablet (20 mg) by mouth daily  Dispense: 90 tablet; Refill: 1  - valsartan-hydrochlorothiazide (DIOVAN HCT) 160-12.5 MG tablet; Take 1 tablet by mouth daily  Dispense: 90 tablet; Refill: 1  - VENOUS COLLECTION  - Lipid Panel (BFP)  - Comprehensive Metobolic Panel (BFP)    2. Mixed hyperlipidemia  Is dong well on medications, refilled.  - atorvastatin (LIPITOR) 20 MG tablet; Take 1 tablet (20 mg) by mouth daily  Dispense: 90 tablet; Refill: 1    3. Essential hypertension, benign  Refilled. Slightly high today, but controlled at home per patient.  - metoprolol succinate ER (TOPROL XL) 50 MG 24 hr tablet; Take 1 tablet (50 mg) by mouth daily  Dispense: 90 tablet; Refill: 1  - valsartan-hydrochlorothiazide (DIOVAN HCT) 160-12.5 MG tablet; Take 1 tablet by mouth daily  Dispense: 90 tablet; Refill: 1            BMI:   Estimated body mass index is 29.7 kg/m  as calculated from the following:    Height as of this encounter: 1.588 m (5' 2.5\").    Weight as of this encounter: 74.8 kg (165 " "lb).         FUTURE APPOINTMENTS:       - Follow-up visit in 6 months.    No follow-ups on file.    Shagufta Nichols MD  [facility]    Subjective     Nursing Notes:   Margarita Morales CMA  10/24/2023  7:51 AM  Signed  Chief Complaint   Patient presents with    Recheck Medication     Fasting today, refill medications     Pre-visit Screening:  Immunizations:  up to date  Colonoscopy:  is up to date  Mammogram: is up to date  Asthma Action Test/Plan:  NA  PHQ9:  NA  GAD7:  NA  Questioned patient about current smoking habits Pt. has never smoked.  Ok to leave detailed message on voice mail for today's visit only Yes, phone # [phone]       Shayla Winter is a 68 year old female who presents to clinic today for the following health issues   HPI     Here to followup on her cholesterol and blood presssure. She is doing well on the medications.  She has good blood pressures at home, in normal range, especially a little later in the morning.   Also due for her cholesterol. She is doing well on the medications.  Followed by endocrinology for her diabetes. Hgba1c higher at 7.0%.        Review of Systems   Constitutional, HEENT, cardiovascular, pulmonary, gi and gu systems are negative, except as otherwise noted.      Objective    BP (!) 140/84 (BP Location: Right arm, Patient Position: Sitting, Cuff Size: Adult Large)   Pulse 77   Temp 98.1  F (36.7  C) (Temporal)   Ht 1.588 m (5' 2.5\")   Wt 74.8 kg (165 lb)   LMP 09/14/2005   SpO2 98%   BMI 29.70 kg/m    Body mass index is 29.7 kg/m .  Physical Exam   GENERAL: healthy, alert and no distress  RESP: lungs clear to auscultation - no rales, rhonchi or wheezes  CV: regular rate and rhythm, normal S1 S2, no S3 or S4, no murmur, click or rub, no peripheral edema and peripheral pulses strong  MS: no gross musculoskeletal defects noted, no edema  NEURO: Normal strength and tone, mentation intact and speech normal  PSYCH: mentation appears normal, affect " normal/bright    No results found for any visits on 10/24/23.

## 2023-10-24 ENCOUNTER — OFFICE VISIT (OUTPATIENT)
Dept: FAMILY MEDICINE | Facility: CLINIC | Age: 68
End: 2023-10-24

## 2023-10-24 VITALS
WEIGHT: 165 LBS | HEIGHT: 63 IN | TEMPERATURE: 98.1 F | OXYGEN SATURATION: 98 % | DIASTOLIC BLOOD PRESSURE: 84 MMHG | HEART RATE: 77 BPM | BODY MASS INDEX: 29.23 KG/M2 | SYSTOLIC BLOOD PRESSURE: 140 MMHG

## 2023-10-24 DIAGNOSIS — E11.8 TYPE 2 DIABETES MELLITUS WITH COMPLICATION, WITHOUT LONG-TERM CURRENT USE OF INSULIN (H): ICD-10-CM

## 2023-10-24 DIAGNOSIS — E78.2 MIXED HYPERLIPIDEMIA: ICD-10-CM

## 2023-10-24 DIAGNOSIS — I10 ESSENTIAL HYPERTENSION, BENIGN: ICD-10-CM

## 2023-10-24 LAB
ALBUMIN SERPL-MCNC: 4.6 G/DL (ref 3.6–5.1)
ALBUMIN/GLOB SERPL: 1.6 {RATIO} (ref 1–2.5)
ALP SERPL-CCNC: 66 U/L (ref 33–130)
ALT 1742-6: 27 U/L (ref 0–32)
AST 1920-8: 17 U/L (ref 0–35)
BILIRUB SERPL-MCNC: 0.8 MG/DL (ref 0.2–1.2)
BUN SERPL-MCNC: 15 MG/DL (ref 7–25)
BUN/CREATININE RATIO: 20.8 (ref 6–32)
CALCIUM SERPL-MCNC: 9.7 MG/DL (ref 8.6–10.3)
CHLORIDE SERPLBLD-SCNC: 100.2 MMOL/L (ref 98–110)
CHOLEST SERPL-MCNC: 183 MG/DL (ref 0–199)
CHOLEST/HDLC SERPL: 3 {RATIO} (ref 0–5)
CO2 SERPL-SCNC: 24.4 MMOL/L (ref 20–32)
CREAT SERPL-MCNC: 0.72 MG/DL (ref 0.6–1.3)
GLOBULIN, CALCULATED - QUEST: 2.8 (ref 1.9–3.7)
GLUCOSE SERPL-MCNC: 174 MG/DL (ref 60–99)
HDLC SERPL-MCNC: 59 MG/DL (ref 40–150)
LDLC SERPL CALC-MCNC: 86 MG/DL (ref 0–130)
POTASSIUM SERPL-SCNC: 4.32 MMOL/L (ref 3.5–5.3)
PROT SERPL-MCNC: 7.4 G/DL (ref 6.1–8.1)
SODIUM SERPL-SCNC: 137 MMOL/L (ref 135–146)
TRIGL SERPL-MCNC: 191 MG/DL (ref 0–149)

## 2023-10-24 PROCEDURE — 36415 COLL VENOUS BLD VENIPUNCTURE: CPT | Performed by: FAMILY MEDICINE

## 2023-10-24 PROCEDURE — 80061 LIPID PANEL: CPT | Performed by: FAMILY MEDICINE

## 2023-10-24 PROCEDURE — 99213 OFFICE O/P EST LOW 20 MIN: CPT | Performed by: FAMILY MEDICINE

## 2023-10-24 PROCEDURE — 80053 COMPREHEN METABOLIC PANEL: CPT | Performed by: FAMILY MEDICINE

## 2023-10-24 RX ORDER — METOPROLOL SUCCINATE 50 MG/1
50 TABLET, EXTENDED RELEASE ORAL DAILY
Qty: 90 TABLET | Refills: 1 | Status: SHIPPED | OUTPATIENT
Start: 2023-10-24 | End: 2024-04-15

## 2023-10-24 RX ORDER — VALSARTAN AND HYDROCHLOROTHIAZIDE 160; 12.5 MG/1; MG/1
1 TABLET, FILM COATED ORAL DAILY
Qty: 90 TABLET | Refills: 1 | Status: SHIPPED | OUTPATIENT
Start: 2023-10-24 | End: 2024-04-15

## 2023-10-24 RX ORDER — ATORVASTATIN CALCIUM 20 MG/1
20 TABLET, FILM COATED ORAL DAILY
Qty: 90 TABLET | Refills: 1 | Status: SHIPPED | OUTPATIENT
Start: 2023-10-24 | End: 2024-04-15

## 2023-10-24 NOTE — NURSING NOTE
Chief Complaint   Patient presents with    Recheck Medication     Fasting today, refill medications     Pre-visit Screening:  Immunizations:  up to date  Colonoscopy:  is up to date  Mammogram: is up to date  Asthma Action Test/Plan:  NA  PHQ9:  NA  GAD7:  NA  Questioned patient about current smoking habits Pt. has never smoked.  Ok to leave detailed message on voice mail for today's visit only Yes, phone # [phone]

## 2024-04-05 NOTE — PROGRESS NOTES
Assessment & Plan   Problem List Items Addressed This Visit          Endocrine    Type 2 diabetes mellitus with complication, without long-term current use of insulin (H)--followed by endocrinology, Dr. Don    Relevant Medications    atorvastatin (LIPITOR) 20 MG tablet    valsartan-hydrochlorothiazide (DIOVAN HCT) 160-12.5 MG tablet    Other Relevant Orders    HEMOGLOBIN A1C (BFP) (Completed)    VENOUS COLLECTION (Completed)    ALBUMIN RANDOM URINE QUANTITATIVE (BFP) (Completed)       Circulatory    Essential hypertension, benign    Relevant Medications    valsartan-hydrochlorothiazide (DIOVAN HCT) 160-12.5 MG tablet    metoprolol succinate ER (TOPROL XL) 100 MG 24 hr tablet    Other Relevant Orders    Basic Metabolic Panel (BFP)     Other Visit Diagnoses       Medicare annual wellness visit, subsequent    -  Primary    Encounter for screening mammogram for breast cancer        Relevant Orders    MA Screening Bilateral w/ Rob    Radiology Referral    Screening for osteoporosis        Relevant Orders    Radiology Referral    Mixed hyperlipidemia        Relevant Medications    atorvastatin (LIPITOR) 20 MG tablet    Other Relevant Orders    Lipid Panel (BFP)           1. Medicare annual wellness visit, subsequent  Completed.    2. Encounter for screening mammogram for breast cancer    - MA Screening Bilateral w/ Rob  - Radiology Referral    3. Screening for osteoporosis    - Radiology Referral    4. Type 2 diabetes mellitus with complication, without long-term current use of insulin (H)  She is followed by endocrinology, would like labs checked her and will followup with her specialist.  - atorvastatin (LIPITOR) 20 MG tablet; Take 1 tablet (20 mg) by mouth daily  Dispense: 90 tablet; Refill: 1  - valsartan-hydrochlorothiazide (DIOVAN HCT) 160-12.5 MG tablet; Take 1 tablet by mouth daily  Dispense: 90 tablet; Refill: 1  - HEMOGLOBIN A1C (BFP)  - VENOUS COLLECTION  - ALBUMIN RANDOM URINE QUANTITATIVE (BFP)    5.  "Mixed hyperlipidemia  Check labs, she is doing well on the medications, refilled.  - atorvastatin (LIPITOR) 20 MG tablet; Take 1 tablet (20 mg) by mouth daily  Dispense: 90 tablet; Refill: 1  - Lipid Panel (BFP)    6. Essential hypertension, benign  High, I will increase the dose of metoprolol, watch blood pressures at home.  - valsartan-hydrochlorothiazide (DIOVAN HCT) 160-12.5 MG tablet; Take 1 tablet by mouth daily  Dispense: 90 tablet; Refill: 1  - Basic Metabolic Panel (BFP)  - metoprolol succinate ER (TOPROL XL) 100 MG 24 hr tablet; Take 1 tablet (100 mg) by mouth daily  Dispense: 90 tablet; Refill: 1            BMI  Estimated body mass index is 29.88 kg/m  as calculated from the following:    Height as of this encounter: 1.588 m (5' 2.5\").    Weight as of this encounter: 75.3 kg (166 lb).         FUTURE APPOINTMENTS:       - Follow-up visit in 6 months.    No follow-ups on file.    Shagufta Nichols MD  [facility]    Subjective     Nursing Notes:   Kimberley Arrington MA  4/15/2024  8:43 AM  Sign at exiting of workspace  Chief Complaint   Patient presents with    Recheck Medication     Pt here for a medication recheck and refill. Is fasting.         Shayla Winter is a 68 year old female who presents to clinic today for the following health issues HPI     Here to followup on her medications.  Followed by Dr. Don for her diabetes. She is doing well on medications for blood pressure and cholesterol.  1 year ago tg were high  Not checking blood pressures at home. It was a little higher last time. Higher in the mornings. As the day goes on it tends to come down.  Previous dexa scan was normal. Wants to wait.        Review of Systems   Constitutional, HEENT, cardiovascular, pulmonary, gi and gu systems are negative, except as otherwise noted.      Objective    BP (!) 156/92 (BP Location: Right arm, Patient Position: Sitting, Cuff Size: Adult Regular)   Pulse 85   Temp 97.1  F (36.2  C) " "(Temporal)   Ht 1.588 m (5' 2.5\")   Wt 75.3 kg (166 lb)   LMP 09/14/2005   SpO2 96%   BMI 29.88 kg/m    Body mass index is 29.88 kg/m .  Physical Exam   GENERAL: alert and no distress  RESP: lungs clear to auscultation - no rales, rhonchi or wheezes  CV: regular rate and rhythm, normal S1 S2, no S3 or S4, no murmur, click or rub, no peripheral edema  MS: no gross musculoskeletal defects noted, no edema  NEURO: Normal strength and tone, mentation intact and speech normal  PSYCH: mentation appears normal, affect normal/bright    Results for orders placed or performed in visit on 04/15/24   HEMOGLOBIN A1C (BFP)     Status: Abnormal   Result Value Ref Range    Hemoglobin A1C 7.3 (A) 4 - 5.6 %   ALBUMIN RANDOM URINE QUANTITATIVE (BFP)     Status: None   Result Value Ref Range    Albumin mg/L 10 30    Creatinine Urine mg/dL 10 300 mg/dL    Albumin Urine mg/g Cr <30 30 MG/G Creatinine         "

## 2024-04-15 ENCOUNTER — OFFICE VISIT (OUTPATIENT)
Dept: FAMILY MEDICINE | Facility: CLINIC | Age: 69
End: 2024-04-15

## 2024-04-15 VITALS
HEIGHT: 63 IN | DIASTOLIC BLOOD PRESSURE: 92 MMHG | TEMPERATURE: 97.1 F | BODY MASS INDEX: 29.41 KG/M2 | WEIGHT: 166 LBS | HEART RATE: 85 BPM | OXYGEN SATURATION: 96 % | SYSTOLIC BLOOD PRESSURE: 156 MMHG

## 2024-04-15 DIAGNOSIS — Z13.820 SCREENING FOR OSTEOPOROSIS: ICD-10-CM

## 2024-04-15 DIAGNOSIS — E78.2 MIXED HYPERLIPIDEMIA: ICD-10-CM

## 2024-04-15 DIAGNOSIS — I10 ESSENTIAL HYPERTENSION, BENIGN: ICD-10-CM

## 2024-04-15 DIAGNOSIS — E11.8 TYPE 2 DIABETES MELLITUS WITH COMPLICATION, WITHOUT LONG-TERM CURRENT USE OF INSULIN (H): ICD-10-CM

## 2024-04-15 DIAGNOSIS — Z12.31 ENCOUNTER FOR SCREENING MAMMOGRAM FOR BREAST CANCER: ICD-10-CM

## 2024-04-15 DIAGNOSIS — Z00.00 MEDICARE ANNUAL WELLNESS VISIT, SUBSEQUENT: Primary | ICD-10-CM

## 2024-04-15 LAB
ALBUMIN (URINE) MG/L: 10
ALBUMIN URINE MG/G CR: <30 MG/G CREATININE
BUN SERPL-MCNC: 12 MG/DL (ref 7–25)
BUN/CREATININE RATIO: 15.2 (ref 6–32)
CALCIUM SERPL-MCNC: 9.8 MG/DL (ref 8.6–10.3)
CHLORIDE SERPLBLD-SCNC: 101.5 MMOL/L (ref 98–110)
CHOLEST SERPL-MCNC: 174 MG/DL (ref 0–199)
CHOLEST/HDLC SERPL: 3 {RATIO} (ref 0–5)
CO2 SERPL-SCNC: 25.8 MMOL/L (ref 20–32)
CREAT SERPL-MCNC: 0.79 MG/DL (ref 0.6–1.3)
CREATININE URINE MG/DL: 10 MG/DL
GLUCOSE SERPL-MCNC: 155 MG/DL (ref 60–99)
HDLC SERPL-MCNC: 56 MG/DL (ref 40–150)
HEMOGLOBIN A1C: 7.3 % (ref 4–5.6)
LDLC SERPL CALC-MCNC: 81 MG/DL (ref 0–130)
POTASSIUM SERPL-SCNC: 4.52 MMOL/L (ref 3.5–5.3)
SODIUM SERPL-SCNC: 138.4 MMOL/L (ref 135–146)
TRIGL SERPL-MCNC: 185 MG/DL (ref 0–149)

## 2024-04-15 PROCEDURE — 82043 UR ALBUMIN QUANTITATIVE: CPT | Performed by: FAMILY MEDICINE

## 2024-04-15 PROCEDURE — 80048 BASIC METABOLIC PNL TOTAL CA: CPT | Performed by: FAMILY MEDICINE

## 2024-04-15 PROCEDURE — 82570 ASSAY OF URINE CREATININE: CPT | Performed by: FAMILY MEDICINE

## 2024-04-15 PROCEDURE — G0439 PPPS, SUBSEQ VISIT: HCPCS | Performed by: FAMILY MEDICINE

## 2024-04-15 PROCEDURE — 80061 LIPID PANEL: CPT | Performed by: FAMILY MEDICINE

## 2024-04-15 PROCEDURE — 99214 OFFICE O/P EST MOD 30 MIN: CPT | Mod: 25 | Performed by: FAMILY MEDICINE

## 2024-04-15 PROCEDURE — 83036 HEMOGLOBIN GLYCOSYLATED A1C: CPT | Performed by: FAMILY MEDICINE

## 2024-04-15 PROCEDURE — 36415 COLL VENOUS BLD VENIPUNCTURE: CPT | Performed by: FAMILY MEDICINE

## 2024-04-15 RX ORDER — METOPROLOL SUCCINATE 50 MG/1
50 TABLET, EXTENDED RELEASE ORAL DAILY
Qty: 90 TABLET | Refills: 1 | Status: CANCELLED | OUTPATIENT
Start: 2024-04-15

## 2024-04-15 RX ORDER — METOPROLOL SUCCINATE 100 MG/1
100 TABLET, EXTENDED RELEASE ORAL DAILY
Qty: 90 TABLET | Refills: 1 | Status: SHIPPED | OUTPATIENT
Start: 2024-04-15

## 2024-04-15 RX ORDER — VALSARTAN AND HYDROCHLOROTHIAZIDE 160; 12.5 MG/1; MG/1
1 TABLET, FILM COATED ORAL DAILY
Qty: 90 TABLET | Refills: 1 | Status: SHIPPED | OUTPATIENT
Start: 2024-04-15

## 2024-04-15 RX ORDER — ATORVASTATIN CALCIUM 20 MG/1
20 TABLET, FILM COATED ORAL DAILY
Qty: 90 TABLET | Refills: 1 | Status: SHIPPED | OUTPATIENT
Start: 2024-04-15

## 2024-04-15 NOTE — PATIENT INSTRUCTIONS
Health Maintenance   Topic Date Due    ADVANCE CARE PLANNING  06/20/2023    MICROALBUMIN  04/17/2024    LIPID  04/24/2024    EYE EXAM  06/01/2024    MAMMO SCREENING  07/11/2024    BMP  10/24/2024    FALL RISK ASSESSMENT  10/24/2024    MEDICARE ANNUAL WELLNESS VISIT  04/15/2025    DEXA  07/08/2027    COLORECTAL CANCER SCREENING  07/31/2027    DTAP/TDAP/TD IMMUNIZATION (3 - Td or Tdap) 11/05/2030    HEPATITIS C SCREENING  Completed    PHQ-2 (once per calendar year)  Completed    INFLUENZA VACCINE  Completed    Pneumococcal Vaccine: 65+ Years  Completed    ZOSTER IMMUNIZATION  Completed    RSV VACCINE (Pregnancy & 60+)  Completed    COVID-19 Vaccine  Completed    IPV IMMUNIZATION  Aged Out    HPV IMMUNIZATION  Aged Out    MENINGITIS IMMUNIZATION  Aged Out    RSV MONOCLONAL ANTIBODY  Aged Out    A1C  Discontinued    DIABETIC FOOT EXAM  Discontinued    PAP  Discontinued

## 2024-04-15 NOTE — PROGRESS NOTES
Shayla Winter is a 68 year old female who presents for Medicare Annual Wellness Visit.    Current providers caring for this patient include:  Patient Care Team:  Shagufta Nichols MD as PCP - General (Family Medicine)  Shagufta Nichols MD as Assigned PCP    Complete Medical and Social history reviewed with patient, outlined below.    Patient Active Problem List   Diagnosis    Essential hypertension, benign    Other hyperlipidemia    ACP (advance care planning)    Health Care Home    Overweight (BMI 25.0-29.9)    S/P hip replacement    Type 2 diabetes mellitus with complication, without long-term current use of insulin (H)--followed by endocrinology, Dr. Don    Mixed conductive and sensorineural hearing loss of both ears       Past Medical History:   Diagnosis Date    Arthritis     Diabetes mellitus (H)     Mixed conductive and sensorineural hearing loss of both ears 11/18/2019    Other and unspecified hyperlipidemia 1998    Hyperlipidemia    S/P hip replacement 8/28/2013    Unspecified essential hypertension 1988    Hypertension, Essential       Past Surgical History:   Procedure Laterality Date    ARTHROPLASTY HIP ANTERIOR  8/28/2013    Procedure: ARTHROPLASTY HIP ANTERIOR;  RIGHT DIRECT ANTERIOR TOTAL HIP ARTHROPLASTY (DEPUY)^ (HANA TABLE, C-ARM);  Surgeon: Cricket Castellano MD;  Location: [facility] OR    COLONOSCOPY N/A 7/31/2017    Procedure: COLONOSCOPY;  Colonoscopy ;  Surgeon: Rachelle Vela MD;  Location:  GI    ZZC C-SEC ONLY,PREV C-SEC  1980/1983    Z TREAT ECTOPIC PREG,RMV TUBE/OVARY  1982       Family History   Problem Relation Age of Onset    Hypertension Mother     Lipids Mother     Diabetes Mother     C.A.D. Mother         bypass    Hypertension Father     Diabetes Type 2  Maternal Aunt     Diabetes Type 2  Niece     Thyroid Disease No family hx of     Colon Cancer No family hx of        Social History     Tobacco Use    Smoking status: Never     Passive exposure: Never    Smokeless  tobacco: Never   Substance Use Topics    Alcohol use: Yes     Alcohol/week: 0.8 standard drinks of alcohol     Types: 1 Standard drinks or equivalent per week     Comment: once a week       Diet: regular, low salt/low fat  Physical Activity: active without specific exercise program  Depression Screen:    Over the past 2 weeks, patient has felt down, depressed, or hopeless:  No    Over the past 2 weeks, patient has felt little interest or pleasure in doing things: No    Functional ability/Safety screen:  Up and go test (able to get up and walk longer than 30 seconds): Passed  Patient needs assistance with: nothing  Patient's home has the following possible safety concerns: none identified  Patient has concerns about her hearing:  Yes  Cognitive Screen  Patient repeats three objects (ball, flag, tree)      Clock drawing test:   NORMAL  Recalls three objects after 3 minutes (ball,flag,tree):                                                                                               recalls 3 objects (3 points)    Physical Exam:  LMP 09/14/2005    There is no height or weight on file to calculate BMI.             End of Life Planning:   Patient currently has an advanced directive: No.  I have verified the patient's ablity to prepare an advanced directive/make health care decisions.  Literature was provided to assist patient in preparing an advanced directive.    Education/Counseling:   Based on review of the above information, the following items were addressed:      Discussed healthy diet and regular exercise.    Appropriate preventive services were discussed with this patient, including applicable screening as appropriate for cardiovascular disease, diabetes, osteopenia/osteoporosis, and glaucoma.  As appropriate for age/gender, discussed screening for colorectal cancer, prostate cancer, breast cancer, and cervical cancer.   Checklist reviewing preventive services available has been given to the patient.        RICHARD  reviewed with patient and is up to date.

## 2024-04-15 NOTE — NURSING NOTE
Chief Complaint   Patient presents with    Recheck Medication     Pt here for a medication recheck and refill. Is fasting.

## 2024-06-05 ENCOUNTER — TRANSFERRED RECORDS (OUTPATIENT)
Dept: FAMILY MEDICINE | Facility: CLINIC | Age: 69
End: 2024-06-05

## 2024-07-12 LAB — MAMMOGRAM: NORMAL

## 2024-10-02 ENCOUNTER — TRANSFERRED RECORDS (OUTPATIENT)
Dept: FAMILY MEDICINE | Facility: CLINIC | Age: 69
End: 2024-10-02

## 2024-10-11 NOTE — PROGRESS NOTES
"Assessment & Plan   Problem List Items Addressed This Visit       Essential hypertension, benign    Relevant Medications    metoprolol succinate ER (TOPROL XL) 100 MG 24 hr tablet    valsartan-hydrochlorothiazide (DIOVAN HCT) 160-12.5 MG tablet    Other Relevant Orders    VENOUS COLLECTION (Completed)    Comprehensive Metobolic Panel (BFP)    Type 2 diabetes mellitus with complication, without long-term current use of insulin (H)--followed by endocrinology, Dr. Don    Relevant Medications    atorvastatin (LIPITOR) 20 MG tablet    valsartan-hydrochlorothiazide (DIOVAN HCT) 160-12.5 MG tablet     Other Visit Diagnoses       Mixed hyperlipidemia        Relevant Medications    atorvastatin (LIPITOR) 20 MG tablet           1. Type 2 diabetes mellitus with complication, without long-term current use of insulin (H)  Followed by endocrinology.  - atorvastatin (LIPITOR) 20 MG tablet; Take 1 tablet (20 mg) by mouth daily.  Dispense: 90 tablet; Refill: 1  - valsartan-hydrochlorothiazide (DIOVAN HCT) 160-12.5 MG tablet; Take 1 tablet by mouth daily.  Dispense: 90 tablet; Refill: 1    2. Mixed hyperlipidemia  Check labs, refilled.  - atorvastatin (LIPITOR) 20 MG tablet; Take 1 tablet (20 mg) by mouth daily.  Dispense: 90 tablet; Refill: 1    3. Essential hypertension, benign  Controlled on medications, refilled.  - metoprolol succinate ER (TOPROL XL) 100 MG 24 hr tablet; Take 1 tablet (100 mg) by mouth daily.  Dispense: 90 tablet; Refill: 1  - valsartan-hydrochlorothiazide (DIOVAN HCT) 160-12.5 MG tablet; Take 1 tablet by mouth daily.  Dispense: 90 tablet; Refill: 1  - VENOUS COLLECTION  - Comprehensive Metobolic Panel (BFP)            BMI  Estimated body mass index is 30.24 kg/m  as calculated from the following:    Height as of 4/15/24: 1.588 m (5' 2.5\").    Weight as of this encounter: 76.2 kg (168 lb).         FUTURE APPOINTMENTS:       - Follow-up visit in 6 months. We manage her chronic medical care.    No follow-ups " on file.    Shagufta Nichols MD  [facility]    Subjective     Nursing Notes:   Margarita Morales, NIRMAL  10/15/2024  7:46 AM  Signed  Chief Complaint   Patient presents with    Recheck Medication     Fasting today, refill medications     Pre-visit Screening:  Immunizations:  up to date  Colonoscopy:  is up to date  Mammogram: is up to date  Asthma Action Test/Plan:  NA  PHQ9:  NA  GAD7:  NA  Questioned patient about current smoking habits Pt. has never smoked.  Ok to leave detailed message on voice mail for today's visit only Yes, phone # [phone]       Shayla Winter is a 69 year old female who presents to clinic today for the following health issues   HPI     Here for medication check. She is doing well on blood pressure and cholesterol medication.   Followed by endocrinology for her diabetes. Considering starting monjaro.         Review of Systems   Constitutional, HEENT, cardiovascular, pulmonary, gi and gu systems are negative, except as otherwise noted.      Objective    /86 (BP Location: Right arm, Patient Position: Sitting, Cuff Size: Adult Large)   Pulse 69   Temp 97.9  F (36.6  C) (Temporal)   Wt 76.2 kg (168 lb)   LMP 09/14/2005   SpO2 97%   BMI 30.24 kg/m    Body mass index is 30.24 kg/m .  Physical Exam   GENERAL: alert and no distress  RESP: lungs clear to auscultation - no rales, rhonchi or wheezes  CV: regular rate and rhythm, normal S1 S2, no S3 or S4, no murmur, click or rub, no peripheral edema  MS: no gross musculoskeletal defects noted, no edema  PSYCH: mentation appears normal, affect normal/bright    No results found for any visits on 10/15/24.

## 2024-10-15 ENCOUNTER — OFFICE VISIT (OUTPATIENT)
Dept: FAMILY MEDICINE | Facility: CLINIC | Age: 69
End: 2024-10-15

## 2024-10-15 VITALS
DIASTOLIC BLOOD PRESSURE: 86 MMHG | WEIGHT: 168 LBS | SYSTOLIC BLOOD PRESSURE: 132 MMHG | TEMPERATURE: 97.9 F | OXYGEN SATURATION: 97 % | HEART RATE: 69 BPM | BODY MASS INDEX: 30.24 KG/M2

## 2024-10-15 DIAGNOSIS — I10 ESSENTIAL HYPERTENSION, BENIGN: ICD-10-CM

## 2024-10-15 DIAGNOSIS — E78.2 MIXED HYPERLIPIDEMIA: ICD-10-CM

## 2024-10-15 DIAGNOSIS — E11.8 TYPE 2 DIABETES MELLITUS WITH COMPLICATION, WITHOUT LONG-TERM CURRENT USE OF INSULIN (H): ICD-10-CM

## 2024-10-15 LAB
ALBUMIN SERPL-MCNC: 4.5 G/DL (ref 3.6–5.1)
ALP SERPL-CCNC: 69 U/L (ref 33–130)
ALT 1742-6: 22 U/L (ref 0–32)
AST 1920-8: 34 U/L (ref 0–35)
BILIRUB SERPL-MCNC: 0.7 MG/DL (ref 0.2–1.2)
BUN SERPL-MCNC: 12 MG/DL (ref 7–25)
BUN/CREATININE RATIO: 16 (ref 6–32)
CALCIUM SERPL-MCNC: 9.9 MG/DL (ref 8.6–10.3)
CHLORIDE SERPLBLD-SCNC: 100.1 MMOL/L (ref 98–110)
CO2 SERPL-SCNC: 25.2 MMOL/L (ref 20–32)
CREAT SERPL-MCNC: 0.73 MG/DL (ref 0.6–1.3)
GLUCOSE SERPL-MCNC: 161 MG/DL (ref 60–99)
POTASSIUM SERPL-SCNC: 4.27 MMOL/L (ref 3.5–5.3)
PROT SERPL-MCNC: 7.1 G/DL (ref 6.1–8.1)
SODIUM SERPL-SCNC: 137.1 MMOL/L (ref 135–146)

## 2024-10-15 PROCEDURE — 99213 OFFICE O/P EST LOW 20 MIN: CPT | Performed by: FAMILY MEDICINE

## 2024-10-15 PROCEDURE — 36415 COLL VENOUS BLD VENIPUNCTURE: CPT | Performed by: FAMILY MEDICINE

## 2024-10-15 PROCEDURE — G2211 COMPLEX E/M VISIT ADD ON: HCPCS | Performed by: FAMILY MEDICINE

## 2024-10-15 PROCEDURE — 80053 COMPREHEN METABOLIC PANEL: CPT | Performed by: FAMILY MEDICINE

## 2024-10-15 RX ORDER — ATORVASTATIN CALCIUM 20 MG/1
20 TABLET, FILM COATED ORAL DAILY
Qty: 90 TABLET | Refills: 1 | Status: SHIPPED | OUTPATIENT
Start: 2024-10-15

## 2024-10-15 RX ORDER — VALSARTAN AND HYDROCHLOROTHIAZIDE 160; 12.5 MG/1; MG/1
1 TABLET, FILM COATED ORAL DAILY
Qty: 90 TABLET | Refills: 1 | Status: SHIPPED | OUTPATIENT
Start: 2024-10-15

## 2024-10-15 RX ORDER — METOPROLOL SUCCINATE 100 MG/1
100 TABLET, EXTENDED RELEASE ORAL DAILY
Qty: 90 TABLET | Refills: 1 | Status: SHIPPED | OUTPATIENT
Start: 2024-10-15

## 2024-10-15 NOTE — NURSING NOTE
Chief Complaint   Patient presents with    Recheck Medication     Fasting today, refill medications     Pre-visit Screening:  Immunizations:  up to date  Colonoscopy:  is up to date  Mammogram: is up to date  Asthma Action Test/Plan:  NA  PHQ9:  NA  GAD7:  NA  Questioned patient about current smoking habits Pt. has never smoked.  Ok to leave detailed message on voice mail for today's visit only Yes, phone # [phone]

## 2025-06-11 ENCOUNTER — TRANSFERRED RECORDS (OUTPATIENT)
Dept: FAMILY MEDICINE | Facility: CLINIC | Age: 70
End: 2025-06-11

## (undated) DEVICE — KIT ENDO TURNOVER/PROCEDURE W/CLEAN A SCOPE LINERS 103888

## (undated) RX ORDER — FENTANYL CITRATE 50 UG/ML
INJECTION, SOLUTION INTRAMUSCULAR; INTRAVENOUS
Status: DISPENSED
Start: 2017-07-31